# Patient Record
Sex: MALE | Race: WHITE | URBAN - METROPOLITAN AREA
[De-identification: names, ages, dates, MRNs, and addresses within clinical notes are randomized per-mention and may not be internally consistent; named-entity substitution may affect disease eponyms.]

---

## 2023-04-05 PROBLEM — G47.52 UNCONTROLLED REM SLEEP BEHAVIOR DISORDER: Status: ACTIVE | Noted: 2023-04-05

## 2023-04-05 PROBLEM — K21.9 GERD (GASTROESOPHAGEAL REFLUX DISEASE): Status: ACTIVE | Noted: 2023-04-05

## 2023-04-05 PROBLEM — F25.0: Status: ACTIVE | Noted: 2023-04-05

## 2023-04-05 PROBLEM — I87.2 VENOUS INSUFFICIENCY: Status: ACTIVE | Noted: 2023-04-05

## 2023-04-05 PROBLEM — G30.9 ALZHEIMER'S DEMENTIA WITH BEHAVIORAL DISTURBANCE (MULTI): Status: ACTIVE | Noted: 2023-04-05

## 2023-04-05 PROBLEM — F32.A DEPRESSION: Status: ACTIVE | Noted: 2023-04-05

## 2023-04-05 PROBLEM — E55.9 VITAMIN D DEFICIENCY: Status: ACTIVE | Noted: 2023-04-05

## 2023-04-05 PROBLEM — J44.9 COPD (CHRONIC OBSTRUCTIVE PULMONARY DISEASE) (MULTI): Status: ACTIVE | Noted: 2023-04-05

## 2023-04-05 PROBLEM — R53.81 DEBILITY: Status: ACTIVE | Noted: 2023-04-05

## 2023-04-05 PROBLEM — R29.6 MULTIPLE FALLS: Status: ACTIVE | Noted: 2023-04-05

## 2023-04-05 PROBLEM — N40.0 BPH (BENIGN PROSTATIC HYPERPLASIA): Status: ACTIVE | Noted: 2023-04-05

## 2023-04-05 PROBLEM — G20.A1 PARKINSONS DISEASE (MULTI): Status: ACTIVE | Noted: 2023-04-05

## 2023-04-05 PROBLEM — I45.10 RIGHT BUNDLE BRANCH BLOCK (RBBB) ON ELECTROCARDIOGRAPHY: Status: ACTIVE | Noted: 2023-04-05

## 2023-04-05 PROBLEM — F02.818 ALZHEIMER'S DEMENTIA WITH BEHAVIORAL DISTURBANCE (MULTI): Status: ACTIVE | Noted: 2023-04-05

## 2023-04-05 PROBLEM — M15.9 OSTEOARTHRITIS, GENERALIZED: Status: ACTIVE | Noted: 2023-04-05

## 2023-04-05 PROBLEM — R26.89 ABNORMALITY OF GAIT DUE TO IMPAIRMENT OF BALANCE: Status: ACTIVE | Noted: 2023-04-05

## 2023-04-05 PROBLEM — R29.818 NEUROCOGNITIVE DEFICITS: Status: ACTIVE | Noted: 2023-04-05

## 2023-04-05 PROBLEM — R46.81 OBSESSIVE-COMPULSIVE BEHAVIOR: Status: ACTIVE | Noted: 2023-04-05

## 2023-04-05 PROBLEM — E78.5 HYPERLIPIDEMIA: Status: ACTIVE | Noted: 2023-04-05

## 2023-04-05 PROBLEM — R41.89 NEUROCOGNITIVE DEFICITS: Status: ACTIVE | Noted: 2023-04-05

## 2023-04-05 PROBLEM — F03.918: Status: ACTIVE | Noted: 2023-04-05

## 2023-06-23 RX ORDER — MELOXICAM 7.5 MG/1
1 TABLET ORAL DAILY PRN
COMMUNITY
Start: 2021-05-17 | End: 2023-07-11 | Stop reason: SDUPTHER

## 2023-06-23 RX ORDER — DOCUSATE SODIUM 100 MG/1
100 CAPSULE, LIQUID FILLED ORAL 2 TIMES DAILY
COMMUNITY
Start: 2020-12-18

## 2023-06-23 RX ORDER — ZINC GLUCONATE 50 MG
TABLET ORAL
COMMUNITY

## 2023-06-23 RX ORDER — ASCORBIC ACID 500 MG
1 TABLET,CHEWABLE ORAL 2 TIMES DAILY
COMMUNITY

## 2023-06-23 RX ORDER — QUETIAPINE FUMARATE 25 MG/1
25 TABLET, FILM COATED ORAL
COMMUNITY
Start: 2019-07-18

## 2023-06-23 RX ORDER — CARBIDOPA AND LEVODOPA 25; 100 MG/1; MG/1
TABLET ORAL 3 TIMES DAILY
COMMUNITY
Start: 2021-06-15

## 2023-06-23 RX ORDER — DIVALPROEX SODIUM 500 MG/1
1 TABLET, DELAYED RELEASE ORAL NIGHTLY
COMMUNITY
Start: 2019-09-04

## 2023-06-23 RX ORDER — ERGOCALCIFEROL 1.25 MG/1
1 CAPSULE ORAL
COMMUNITY
Start: 2021-12-02 | End: 2023-07-11 | Stop reason: SDUPTHER

## 2023-06-23 RX ORDER — ACETAMINOPHEN 325 MG/1
325 TABLET ORAL
COMMUNITY

## 2023-06-23 RX ORDER — LANOLIN ALCOHOL/MO/W.PET/CERES
1 CREAM (GRAM) TOPICAL DAILY
COMMUNITY
Start: 2016-03-14

## 2023-06-23 RX ORDER — PRAVASTATIN SODIUM 40 MG/1
1 TABLET ORAL DAILY
COMMUNITY
End: 2023-07-11 | Stop reason: SDUPTHER

## 2023-06-23 RX ORDER — FINASTERIDE 5 MG/1
5 TABLET, FILM COATED ORAL DAILY
COMMUNITY
Start: 2018-07-05

## 2023-06-23 RX ORDER — MELATONIN 3 MG
CAPSULE ORAL
COMMUNITY
Start: 2020-12-18

## 2023-06-23 RX ORDER — ESCITALOPRAM OXALATE 10 MG/1
10 TABLET ORAL DAILY
COMMUNITY
Start: 2016-05-05

## 2023-06-23 RX ORDER — TAMSULOSIN HYDROCHLORIDE 0.4 MG/1
1 CAPSULE ORAL DAILY
COMMUNITY
Start: 2015-07-10

## 2023-06-23 RX ORDER — ALBUTEROL SULFATE 90 UG/1
2 AEROSOL, METERED RESPIRATORY (INHALATION)
COMMUNITY
Start: 2020-12-18

## 2023-06-23 RX ORDER — IPRATROPIUM BROMIDE AND ALBUTEROL SULFATE 2.5; .5 MG/3ML; MG/3ML
3 SOLUTION RESPIRATORY (INHALATION)
COMMUNITY
Start: 2021-04-26

## 2023-06-23 RX ORDER — PANTOPRAZOLE SODIUM 40 MG/1
1 TABLET, DELAYED RELEASE ORAL DAILY
COMMUNITY
Start: 2020-11-09 | End: 2023-07-11 | Stop reason: SDUPTHER

## 2023-07-11 ENCOUNTER — LAB (OUTPATIENT)
Dept: LAB | Facility: LAB | Age: 78
End: 2023-07-11
Payer: MEDICARE

## 2023-07-11 ENCOUNTER — OFFICE VISIT (OUTPATIENT)
Dept: PRIMARY CARE | Facility: CLINIC | Age: 78
End: 2023-07-11
Payer: MEDICARE

## 2023-07-11 VITALS
OXYGEN SATURATION: 90 % | BODY MASS INDEX: 35.68 KG/M2 | HEIGHT: 66 IN | WEIGHT: 222 LBS | HEART RATE: 92 BPM | DIASTOLIC BLOOD PRESSURE: 72 MMHG | SYSTOLIC BLOOD PRESSURE: 118 MMHG

## 2023-07-11 DIAGNOSIS — R53.81 DEBILITY: ICD-10-CM

## 2023-07-11 DIAGNOSIS — E55.9 VITAMIN D DEFICIENCY: ICD-10-CM

## 2023-07-11 DIAGNOSIS — R39.198 SLOWING OF URINARY STREAM: ICD-10-CM

## 2023-07-11 DIAGNOSIS — R46.81 OBSESSIVE-COMPULSIVE BEHAVIOR: ICD-10-CM

## 2023-07-11 DIAGNOSIS — N40.0 BENIGN PROSTATIC HYPERPLASIA, UNSPECIFIED WHETHER LOWER URINARY TRACT SYMPTOMS PRESENT: ICD-10-CM

## 2023-07-11 DIAGNOSIS — R53.83 FATIGUE, UNSPECIFIED TYPE: ICD-10-CM

## 2023-07-11 DIAGNOSIS — F02.818 ALZHEIMER'S DEMENTIA WITH BEHAVIORAL DISTURBANCE (MULTI): ICD-10-CM

## 2023-07-11 DIAGNOSIS — E29.1 HYPOGONADISM MALE: ICD-10-CM

## 2023-07-11 DIAGNOSIS — R35.0 BENIGN PROSTATIC HYPERPLASIA WITH URINARY FREQUENCY: ICD-10-CM

## 2023-07-11 DIAGNOSIS — F32.A DEPRESSION, UNSPECIFIED DEPRESSION TYPE: ICD-10-CM

## 2023-07-11 DIAGNOSIS — E11.9 TYPE 2 DIABETES MELLITUS WITHOUT COMPLICATION, UNSPECIFIED WHETHER LONG TERM INSULIN USE (MULTI): ICD-10-CM

## 2023-07-11 DIAGNOSIS — R26.89 ABNORMALITY OF GAIT DUE TO IMPAIRMENT OF BALANCE: ICD-10-CM

## 2023-07-11 DIAGNOSIS — G30.9 ALZHEIMER'S DEMENTIA WITH BEHAVIORAL DISTURBANCE (MULTI): ICD-10-CM

## 2023-07-11 DIAGNOSIS — R41.89 NEUROCOGNITIVE DEFICITS: ICD-10-CM

## 2023-07-11 DIAGNOSIS — E78.5 HYPERLIPIDEMIA, UNSPECIFIED HYPERLIPIDEMIA TYPE: ICD-10-CM

## 2023-07-11 DIAGNOSIS — K59.04 CHRONIC IDIOPATHIC CONSTIPATION: ICD-10-CM

## 2023-07-11 DIAGNOSIS — Z51.81 ENCOUNTER FOR THERAPEUTIC DRUG LEVEL MONITORING: ICD-10-CM

## 2023-07-11 DIAGNOSIS — Z13.220 SCREENING, LIPID: ICD-10-CM

## 2023-07-11 DIAGNOSIS — N40.1 BENIGN PROSTATIC HYPERPLASIA WITH URINARY FREQUENCY: ICD-10-CM

## 2023-07-11 DIAGNOSIS — G20.A1 PARKINSONS DISEASE (MULTI): ICD-10-CM

## 2023-07-11 DIAGNOSIS — K21.9 GASTROESOPHAGEAL REFLUX DISEASE WITHOUT ESOPHAGITIS: ICD-10-CM

## 2023-07-11 DIAGNOSIS — R29.818 NEUROCOGNITIVE DEFICITS: ICD-10-CM

## 2023-07-11 DIAGNOSIS — M15.9 OSTEOARTHRITIS, GENERALIZED: Primary | ICD-10-CM

## 2023-07-11 LAB
ALANINE AMINOTRANSFERASE (SGPT) (U/L) IN SER/PLAS: 9 U/L (ref 10–52)
ALBUMIN (G/DL) IN SER/PLAS: 4 G/DL (ref 3.4–5)
ALKALINE PHOSPHATASE (U/L) IN SER/PLAS: 50 U/L (ref 33–136)
ANION GAP IN SER/PLAS: 13 MMOL/L (ref 10–20)
ASPARTATE AMINOTRANSFERASE (SGOT) (U/L) IN SER/PLAS: 14 U/L (ref 9–39)
BILIRUBIN TOTAL (MG/DL) IN SER/PLAS: 1.1 MG/DL (ref 0–1.2)
CALCIDIOL (25 OH VITAMIN D3) (NG/ML) IN SER/PLAS: 60 NG/ML
CALCIUM (MG/DL) IN SER/PLAS: 9.7 MG/DL (ref 8.6–10.3)
CARBON DIOXIDE, TOTAL (MMOL/L) IN SER/PLAS: 28 MMOL/L (ref 21–32)
CHLORIDE (MMOL/L) IN SER/PLAS: 101 MMOL/L (ref 98–107)
CHOLESTEROL (MG/DL) IN SER/PLAS: 138 MG/DL (ref 0–199)
CHOLESTEROL IN HDL (MG/DL) IN SER/PLAS: 46.3 MG/DL
CHOLESTEROL/HDL RATIO: 3
CREATININE (MG/DL) IN SER/PLAS: 1.34 MG/DL (ref 0.5–1.3)
ERYTHROCYTE DISTRIBUTION WIDTH (RATIO) BY AUTOMATED COUNT: 13.5 % (ref 11.5–14.5)
ERYTHROCYTE MEAN CORPUSCULAR HEMOGLOBIN CONCENTRATION (G/DL) BY AUTOMATED: 33.2 G/DL (ref 32–36)
ERYTHROCYTE MEAN CORPUSCULAR VOLUME (FL) BY AUTOMATED COUNT: 95 FL (ref 80–100)
ERYTHROCYTES (10*6/UL) IN BLOOD BY AUTOMATED COUNT: 4.64 X10E12/L (ref 4.5–5.9)
GFR MALE: 54 ML/MIN/1.73M2
GLUCOSE (MG/DL) IN SER/PLAS: 83 MG/DL (ref 74–99)
HEMATOCRIT (%) IN BLOOD BY AUTOMATED COUNT: 44 % (ref 41–52)
HEMOGLOBIN (G/DL) IN BLOOD: 14.6 G/DL (ref 13.5–17.5)
LDL: 71 MG/DL (ref 0–99)
LEUKOCYTES (10*3/UL) IN BLOOD BY AUTOMATED COUNT: 6.5 X10E9/L (ref 4.4–11.3)
PLATELETS (10*3/UL) IN BLOOD AUTOMATED COUNT: 193 X10E9/L (ref 150–450)
POTASSIUM (MMOL/L) IN SER/PLAS: 4.3 MMOL/L (ref 3.5–5.3)
PROSTATE SPECIFIC AG (NG/ML) IN SER/PLAS: 2.16 NG/ML (ref 0–4)
PROTEIN TOTAL: 7.2 G/DL (ref 6.4–8.2)
SODIUM (MMOL/L) IN SER/PLAS: 138 MMOL/L (ref 136–145)
THYROTROPIN (MIU/L) IN SER/PLAS BY DETECTION LIMIT <= 0.05 MIU/L: 1.28 MIU/L (ref 0.44–3.98)
TRIGLYCERIDE (MG/DL) IN SER/PLAS: 106 MG/DL (ref 0–149)
UREA NITROGEN (MG/DL) IN SER/PLAS: 24 MG/DL (ref 6–23)
VALPROIC ACID (UG/ML) IN SER/PLAS: 46 UG/ML (ref 50–100)
VLDL: 21 MG/DL (ref 0–40)

## 2023-07-11 PROCEDURE — 1157F ADVNC CARE PLAN IN RCRD: CPT | Performed by: FAMILY MEDICINE

## 2023-07-11 PROCEDURE — 80061 LIPID PANEL: CPT

## 2023-07-11 PROCEDURE — 1126F AMNT PAIN NOTED NONE PRSNT: CPT | Performed by: FAMILY MEDICINE

## 2023-07-11 PROCEDURE — 1036F TOBACCO NON-USER: CPT | Performed by: FAMILY MEDICINE

## 2023-07-11 PROCEDURE — 80164 ASSAY DIPROPYLACETIC ACD TOT: CPT

## 2023-07-11 PROCEDURE — 36415 COLL VENOUS BLD VENIPUNCTURE: CPT

## 2023-07-11 PROCEDURE — 84443 ASSAY THYROID STIM HORMONE: CPT

## 2023-07-11 PROCEDURE — 80299 QUANTITATIVE ASSAY DRUG: CPT

## 2023-07-11 PROCEDURE — 80053 COMPREHEN METABOLIC PANEL: CPT

## 2023-07-11 PROCEDURE — 84153 ASSAY OF PSA TOTAL: CPT

## 2023-07-11 PROCEDURE — 1159F MED LIST DOCD IN RCRD: CPT | Performed by: FAMILY MEDICINE

## 2023-07-11 PROCEDURE — 1160F RVW MEDS BY RX/DR IN RCRD: CPT | Performed by: FAMILY MEDICINE

## 2023-07-11 PROCEDURE — 83036 HEMOGLOBIN GLYCOSYLATED A1C: CPT

## 2023-07-11 PROCEDURE — 84403 ASSAY OF TOTAL TESTOSTERONE: CPT

## 2023-07-11 PROCEDURE — 85027 COMPLETE CBC AUTOMATED: CPT

## 2023-07-11 PROCEDURE — 82306 VITAMIN D 25 HYDROXY: CPT

## 2023-07-11 PROCEDURE — 99215 OFFICE O/P EST HI 40 MIN: CPT | Performed by: FAMILY MEDICINE

## 2023-07-11 RX ORDER — POLYETHYLENE GLYCOL 3350 17 G/17G
17 POWDER, FOR SOLUTION ORAL DAILY
COMMUNITY
End: 2023-07-11 | Stop reason: SDUPTHER

## 2023-07-11 RX ORDER — POLYETHYLENE GLYCOL 3350 17 G/17G
17 POWDER, FOR SOLUTION ORAL DAILY
Qty: 1700 G | Refills: 3 | Status: SHIPPED | OUTPATIENT
Start: 2023-07-11

## 2023-07-11 RX ORDER — ASPIRIN 81 MG/1
81 TABLET ORAL DAILY
COMMUNITY

## 2023-07-11 RX ORDER — PRAVASTATIN SODIUM 40 MG/1
40 TABLET ORAL DAILY
Qty: 90 TABLET | Refills: 3 | Status: SHIPPED | OUTPATIENT
Start: 2023-07-11

## 2023-07-11 RX ORDER — PANTOPRAZOLE SODIUM 40 MG/1
40 TABLET, DELAYED RELEASE ORAL DAILY
Qty: 90 TABLET | Refills: 3 | Status: SHIPPED | OUTPATIENT
Start: 2023-07-11

## 2023-07-11 RX ORDER — GUAIFENESIN/DEXTROMETHORPHAN 100-10MG/5
5 SYRUP ORAL AS NEEDED
COMMUNITY

## 2023-07-11 RX ORDER — ERGOCALCIFEROL 1.25 MG/1
1 CAPSULE ORAL
Qty: 12 CAPSULE | Refills: 3 | Status: SHIPPED | OUTPATIENT
Start: 2023-07-11

## 2023-07-11 RX ORDER — MELOXICAM 7.5 MG/1
7.5 TABLET ORAL DAILY
Qty: 90 TABLET | Refills: 3 | Status: SHIPPED | OUTPATIENT
Start: 2023-07-11

## 2023-07-11 ASSESSMENT — PATIENT HEALTH QUESTIONNAIRE - PHQ9
2. FEELING DOWN, DEPRESSED OR HOPELESS: NOT AT ALL
1. LITTLE INTEREST OR PLEASURE IN DOING THINGS: NOT AT ALL
SUM OF ALL RESPONSES TO PHQ9 QUESTIONS 1 & 2: 0

## 2023-07-11 NOTE — PATIENT INSTRUCTIONS
Change Miralax to 17 gm DAILY  Continue Colace 100mg twice daily  Increase fluid intake to 100 oz per day.    Add Parkinson's Disease to active diagnosis list - refer to Dr. Daniel's office notes

## 2023-07-11 NOTE — PROGRESS NOTES
General Medical Management Note    77 y.o. male presents for Medical Management.  His brother is present.  HPI    No recent hospitalizations, surgeries or significant injuries.  Patient is walking with a walker due to advancing Parkinson's disease.  He continues to be able to feed himself and make his needs known verbally.  He is residing at [facility] in [place].  His brother is POA and assists with all physician office visits.    Parkinson's disease is managed by neurologist, Dr. Alondra Daniel.  Depression and dementia are managed by a psychiatrist, Dr. Melchor Drake.  BPH is managed by urologist, Dr. Garcia    No BM x 1 wk despite colace BID and Miralax every other day.  He is not sure how much fluid he drinks each day.  His ambulation is limited due to Parkinson's.    Patient had a bronchial and lung infection during the past winter.  He no longer uses an inhaler or DuoNeb nebulized treatments.    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    Drug-induced parkinsonism    Personal history of other diseases of male genital organs     History of testicular mass    Personal history of other diseases of the digestive system     History of hiatal hernia    Personal history of other medical treatment     History of nuclear stress test    Personal history of other mental and behavioral disorders 07/16/2019    History of paranoid schizophrenia    Unilateral inguinal hernia, without obstruction or gangrene, not specified as recurrent     Inguinal  hernia      Past Surgical History:   Procedure Laterality Date    HERNIA REPAIR  2014    Inguinal Hernia Repair    OTHER SURGICAL HISTORY  2014    Cardiovascular Stress Test    OTHER SURGICAL HISTORY  2019    Mohs surgery     Family History   Problem Relation Name Age of Onset    Heart failure Mother      Heart failure Father      Bipolar disorder Mother's Sister        Social History     Socioeconomic History    Marital status:      Spouse name: Not on file    Number of children: Not on file    Years of education: Not on file    Highest education level: Not on file   Occupational History    Not on file   Tobacco Use    Smoking status: Former     Types: Cigarettes     Quit date: 1960     Years since quittin.5    Smokeless tobacco: Never   Substance and Sexual Activity    Alcohol use: Yes     Comment: rarely    Drug use: Never    Sexual activity: Not on file   Other Topics Concern    Not on file   Social History Narrative    Not on file     Social Determinants of Health     Financial Resource Strain: Not on file   Food Insecurity: Not on file   Transportation Needs: Not on file   Physical Activity: Not on file   Stress: Not on file   Social Connections: Not on file   Intimate Partner Violence: Not on file   Housing Stability: Not on file       Current Outpatient Medications on File Prior to Visit   Medication Sig Dispense Refill    acetaminophen (Tylenol) 325 mg tablet Take 1 tablet (325 mg) by mouth. Give 2 tablets by mouth every 6 hrs as needed for pain; elevated temp.      albuterol 90 mcg/actuation inhaler Inhale 2 puffs 4 times a day.      ascorbic acid (Vitamin C) 500 mg chewable tablet Chew 1 tablet (500 mg) 2 times a day.      aspirin 81 mg EC tablet Take 1 tablet (81 mg) by mouth once daily. Give 1 tablet orally one time a day      carbidopa-levodopa (Sinemet)  mg tablet Take by mouth 3 times a day. 1.5 tablet by mouth three times a day      cyanocobalamin (Vitamin B-12)  1,000 mcg tablet Take 1 tablet (1,000 mcg) by mouth once daily.      dextromethorphan-guaifenesin (Robitussin DM)  mg/5 mL syrup Take 5 mL by mouth if needed for cough. Give 10 mL by mouth every 4 hours prn cough      divalproex (Depakote) 500 mg EC tablet Take 1 tablet (500 mg) by mouth once daily at bedtime.      docusate sodium (Colace) 100 mg capsule Take 1 capsule (100 mg) by mouth twice a day. 1 capsule by mouth every 8 hrs prn constipation      ergocalciferol (Vitamin D-2) 1.25 MG (32595 UT) capsule Take 1 capsule (1,250 mcg) by mouth 1 (one) time per week.      escitalopram (Lexapro) 10 mg tablet Take 1 tablet (10 mg) by mouth once daily.      finasteride (Proscar) 5 mg tablet Take 1 tablet (5 mg) by mouth once daily.      folic acid/multivit-min/lutein (CENTRUM SILVER ORAL) Take by mouth.      ipratropium-albuteroL (Duo-Neb) 0.5-2.5 mg/3 mL nebulizer solution Take 3 mL by nebulization 3 times a day. 1 vial inhaled orally via nebulizer every 6 hrs prn shortness of breath, wheezing      melatonin 3 mg capsule Take by mouth.      meloxicam (Mobic) 7.5 mg tablet Take 1 tablet (7.5 mg) by mouth once daily as needed.      pantoprazole (ProtoNix) 40 mg EC tablet Take 1 tablet (40 mg) by mouth once daily.      polyethylene glycol (Glycolax, Miralax) 17 gram/dose powder Take 17 g by mouth once daily. Give 17 gram by mouth one time a day every other day for constipation      pravastatin (Pravachol) 40 mg tablet Take 1 tablet (40 mg) by mouth once daily.      QUEtiapine (SEROquel) 25 mg tablet Take 1 tablet (25 mg) by mouth. Give 0.5 tablet orally every 6 hrs prn agitation      tamsulosin (Flomax) 0.4 mg 24 hr capsule Take 1 capsule (0.4 mg) by mouth once daily.      zinc gluconate 50 mg tablet Take by mouth.       No current facility-administered medications on file prior to visit.       No Known Allergies      ROS: Denies chest pain, SOB, Headache, GI problems     Visit Vitals  /72   Pulse 92   Ht 1.676  "m (5' 6\")   Wt 101 kg (222 lb)   SpO2 90%   BMI 35.83 kg/m²   Smoking Status Former   BSA 2.17 m²        PHYSICAL EXAM:  Alert and oriented x3.  Eyes: EOM grossly intact  Neck supple without lymph adenopathy or carotid bruit.  No masses or thyromegaly  Heart regular rate and rhythm without murmur.  Lungs clear to auscultation.  Legs without edema.  Gait is non-antalgic  Speech clear.  Hearing adequate.          DIAGNOSIS/PLAN:  1. Encounter for therapeutic drug level monitoring  - Valproic Acid; Future  - Carbidopa and Levodopa Quant.Blood; Future    4. Abnormality of gait due to impairment of balance    5. Alzheimer's dementia with behavioral disturbance (CMS/HCC)  Follow-up with Dr. Drake    6. Benign prostatic hyperplasia with urinary frequency  Follow-up with Dr. Garcia    7. Parkinson's Disease  - Follow up with Dr. Daniel    8. Screening, lipid  - Lipid Panel; Future    9. 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 Slowing of urinary stream  - Prostate Specific Antigen; Future    11. Benign prostatic hyperplasia, unspecified whether lower urinary tract symptoms present  - Prostate Specific Antigen; Future    12. Vitamin D deficiency  - Vitamin D, Total; Future    Medications I am managing will be refilled for 1 year.        Return to office in 12 months for comprehensive medical evaluation, long-term medication use monitoring, and preventative services screening    Will continue to monitor, evaluate, assess and treat all problems/diagnoses as appropriate and continue to collaborate with specialists.    Encouraged to sign up with  [employer]    Contact office or send a  [employer] message with any questions or concerns    Patient will only be notified of labs that require medical intervention.    Prescriptions will not be filled unless you are compliant with your follow up appointments or have a follow up appointment scheduled as per instruction of your physician. Refills should be requested at the time " of your visit.    **Charting was completed using voice recognition technology and may include unintended errors**    Marcus Bender DO, FACOFP  Senior Attending Physician  [facility]  [address], [address]  [place] [address] [phone]       Marcus Bender DO, FACOFP

## 2023-07-12 LAB
ESTIMATED AVERAGE GLUCOSE FOR HBA1C: 108 MG/DL
HEMOGLOBIN A1C/HEMOGLOBIN TOTAL IN BLOOD: 5.4 %
TESTOSTERONE (NG/DL) IN SER/PLAS: 218 NG/DL (ref 240–1000)

## 2023-07-21 LAB
Lab: 0.5 MCG/ML
Lab: NORMAL

## 2023-10-11 ENCOUNTER — TELEPHONE (OUTPATIENT)
Dept: PRIMARY CARE | Facility: CLINIC | Age: 78
End: 2023-10-11
Payer: MEDICARE

## 2023-10-11 NOTE — TELEPHONE ENCOUNTER
Pippa with [facility] left .  Phone# [phone]  FAX# [phone]    Patient did not have a BM for one week. He finally had a large BM today. Nurse said he does not have any prn medication orders for constipation and would like to know if an order can be faxed to her.  FAX# [phone]

## 2023-11-02 PROBLEM — K59.09 CHRONIC CONSTIPATION: Status: ACTIVE | Noted: 2023-11-02

## 2023-12-04 ENCOUNTER — APPOINTMENT (OUTPATIENT)
Dept: PRIMARY CARE | Facility: CLINIC | Age: 78
End: 2023-12-04
Payer: MEDICARE

## 2023-12-07 ENCOUNTER — APPOINTMENT (OUTPATIENT)
Dept: PRIMARY CARE | Facility: CLINIC | Age: 78
End: 2023-12-07
Payer: MEDICARE

## 2024-01-22 DIAGNOSIS — B37.2 INTERTRIGO OF GENITOCRURAL REGION DUE TO CANDIDA SPECIES: Primary | ICD-10-CM

## 2024-01-22 RX ORDER — NYSTATIN 100000 U/G
CREAM TOPICAL 2 TIMES DAILY
Qty: 30 G | Refills: 5 | Status: SHIPPED | OUTPATIENT
Start: 2024-01-22 | End: 2024-02-21

## 2024-01-24 ENCOUNTER — TELEPHONE (OUTPATIENT)
Dept: PRIMARY CARE | Facility: CLINIC | Age: 79
End: 2024-01-24
Payer: MEDICARE

## 2024-01-24 DIAGNOSIS — R10.2 PELVIC PAIN: ICD-10-CM

## 2024-01-24 DIAGNOSIS — R30.0 DYSURIA: ICD-10-CM

## 2024-01-24 DIAGNOSIS — R35.0 URINE FREQUENCY: ICD-10-CM

## 2024-01-24 NOTE — TELEPHONE ENCOUNTER
Nurse Pippa @ [facility] calling to request lab order for UA with Urine Culture. Patient is c/o dysuria, urine frequency and pelvic pain.    I put urine orders in and faxed to facility:    [facility]  FAX# [phone]  Phone# [phone]

## 2024-03-01 ENCOUNTER — TELEPHONE (OUTPATIENT)
Dept: PRIMARY CARE | Facility: CLINIC | Age: 79
End: 2024-03-01
Payer: MEDICARE

## 2024-03-01 DIAGNOSIS — R21 RASH: Primary | ICD-10-CM

## 2024-03-01 RX ORDER — NYSTATIN 100000 U/G
CREAM TOPICAL 2 TIMES DAILY
Qty: 30 G | Refills: 0 | Status: SHIPPED | OUTPATIENT
Start: 2024-03-01 | End: 2024-03-07

## 2024-03-01 NOTE — TELEPHONE ENCOUNTER
Constance Shields called from [facility] in [place] stating one of Dr Bender's patients has a fungal rash in his groin area and needs a script for nystatin cream or powder Is this something you are able to do?  If so she would like it faxed to her at   If not please forward to Dr Bender.    Zachery Fontaine is in room 301 at [facility]

## 2024-03-07 DIAGNOSIS — B37.2 INTERTRIGO OF GENITOCRURAL REGION DUE TO CANDIDA SPECIES: Primary | ICD-10-CM

## 2024-03-07 RX ORDER — NYSTATIN 100000 [USP'U]/G
POWDER TOPICAL
Qty: 60 G | Refills: 1 | Status: SHIPPED | OUTPATIENT
Start: 2024-03-07

## 2024-07-11 ENCOUNTER — APPOINTMENT (OUTPATIENT)
Dept: PRIMARY CARE | Facility: CLINIC | Age: 79
End: 2024-07-11
Payer: MEDICARE

## 2024-07-16 ENCOUNTER — TELEMEDICINE (OUTPATIENT)
Dept: BEHAVIORAL HEALTH | Facility: CLINIC | Age: 79
End: 2024-07-16
Payer: MEDICARE

## 2024-07-16 DIAGNOSIS — F25.0 SCHIZOAFFECTIVE DISORDER, MIXED TYPE (MULTI): ICD-10-CM

## 2024-07-16 DIAGNOSIS — F03.918 PSYCHOSIS IN ELDERLY WITH BEHAVIORAL DISTURBANCE (MULTI): ICD-10-CM

## 2024-07-16 DIAGNOSIS — R46.81 OBSESSIVE-COMPULSIVE BEHAVIOR: ICD-10-CM

## 2024-07-16 PROCEDURE — 99205 OFFICE O/P NEW HI 60 MIN: CPT | Performed by: PSYCHIATRY & NEUROLOGY

## 2024-07-16 PROCEDURE — 1157F ADVNC CARE PLAN IN RCRD: CPT | Performed by: PSYCHIATRY & NEUROLOGY

## 2024-07-16 PROCEDURE — 1159F MED LIST DOCD IN RCRD: CPT | Performed by: PSYCHIATRY & NEUROLOGY

## 2024-07-16 PROCEDURE — 1160F RVW MEDS BY RX/DR IN RCRD: CPT | Performed by: PSYCHIATRY & NEUROLOGY

## 2024-07-16 RX ORDER — DIVALPROEX SODIUM 500 MG/1
500 TABLET, DELAYED RELEASE ORAL NIGHTLY
Qty: 90 TABLET | Refills: 1 | Status: SHIPPED | OUTPATIENT
Start: 2024-07-16 | End: 2025-01-12

## 2024-07-16 RX ORDER — QUETIAPINE FUMARATE 25 MG/1
25 TABLET, FILM COATED ORAL NIGHTLY
Qty: 90 TABLET | Refills: 1 | Status: SHIPPED | OUTPATIENT
Start: 2024-07-16 | End: 2025-01-12

## 2024-07-16 RX ORDER — ESCITALOPRAM OXALATE 5 MG/1
TABLET ORAL
Qty: 10 TABLET | Refills: 0 | Status: SHIPPED | OUTPATIENT
Start: 2024-07-16

## 2024-07-16 RX ORDER — ESCITALOPRAM OXALATE 10 MG/1
10 TABLET ORAL DAILY
Qty: 90 TABLET | Refills: 1 | Status: SHIPPED | OUTPATIENT
Start: 2024-07-16 | End: 2024-07-16 | Stop reason: WASHOUT

## 2024-07-16 SDOH — HEALTH STABILITY: PHYSICAL HEALTH: ON AVERAGE, HOW MANY DAYS PER WEEK DO YOU ENGAGE IN MODERATE TO STRENUOUS EXERCISE (LIKE A BRISK WALK)?: 3 DAYS

## 2024-07-16 SDOH — ECONOMIC STABILITY: INCOME INSECURITY: IN THE LAST 12 MONTHS, WAS THERE A TIME WHEN YOU WERE NOT ABLE TO PAY THE MORTGAGE OR RENT ON TIME?: NO

## 2024-07-16 SDOH — ECONOMIC STABILITY: TRANSPORTATION INSECURITY
IN THE PAST 12 MONTHS, HAS THE LACK OF TRANSPORTATION KEPT YOU FROM MEDICAL APPOINTMENTS OR FROM GETTING MEDICATIONS?: NO

## 2024-07-16 SDOH — ECONOMIC STABILITY: FOOD INSECURITY: WITHIN THE PAST 12 MONTHS, THE FOOD YOU BOUGHT JUST DIDN'T LAST AND YOU DIDN'T HAVE MONEY TO GET MORE.: NEVER TRUE

## 2024-07-16 SDOH — HEALTH STABILITY: PHYSICAL HEALTH: ON AVERAGE, HOW MANY MINUTES DO YOU ENGAGE IN EXERCISE AT THIS LEVEL?: 20 MIN

## 2024-07-16 SDOH — ECONOMIC STABILITY: FOOD INSECURITY: WITHIN THE PAST 12 MONTHS, YOU WORRIED THAT YOUR FOOD WOULD RUN OUT BEFORE YOU GOT MONEY TO BUY MORE.: NEVER TRUE

## 2024-07-16 SDOH — ECONOMIC STABILITY: TRANSPORTATION INSECURITY
IN THE PAST 12 MONTHS, HAS LACK OF TRANSPORTATION KEPT YOU FROM MEETINGS, WORK, OR FROM GETTING THINGS NEEDED FOR DAILY LIVING?: NO

## 2024-07-16 ASSESSMENT — SOCIAL DETERMINANTS OF HEALTH (SDOH)
IN THE PAST 12 MONTHS, HAS THE ELECTRIC, GAS, OIL, OR WATER COMPANY THREATENED TO SHUT OFF SERVICE IN YOUR HOME?: NO
HOW HARD IS IT FOR YOU TO PAY FOR THE VERY BASICS LIKE FOOD, HOUSING, MEDICAL CARE, AND HEATING?: NOT HARD AT ALL
HOW OFTEN DO YOU GET TOGETHER WITH FRIENDS OR RELATIVES?: MORE THAN THREE TIMES A WEEK
HOW OFTEN DO YOU ATTEND CHURCH OR RELIGIOUS SERVICES?: MORE THAN 4 TIMES PER YEAR
DO YOU BELONG TO ANY CLUBS OR ORGANIZATIONS SUCH AS CHURCH GROUPS UNIONS, FRATERNAL OR ATHLETIC GROUPS, OR SCHOOL GROUPS?: YES
HOW OFTEN DO YOU GET TOGETHER WITH FRIENDS OR RELATIVES?: MORE THAN THREE TIMES A WEEK
WITHIN THE LAST YEAR, HAVE YOU BEEN KICKED, HIT, SLAPPED, OR OTHERWISE PHYSICALLY HURT BY YOUR PARTNER OR EX-PARTNER?: NO
WITHIN THE LAST YEAR, HAVE YOU BEEN AFRAID OF YOUR PARTNER OR EX-PARTNER?: NO
HOW OFTEN DO YOU ATTENT MEETINGS OF THE CLUB OR ORGANIZATION YOU BELONG TO?: MORE THAN 4 TIMES PER YEAR
WITHIN THE LAST YEAR, HAVE YOU BEEN HUMILIATED OR EMOTIONALLY ABUSED IN OTHER WAYS BY YOUR PARTNER OR EX-PARTNER?: NO
DO YOU BELONG TO ANY CLUBS OR ORGANIZATIONS SUCH AS CHURCH GROUPS UNIONS, FRATERNAL OR ATHLETIC GROUPS, OR SCHOOL GROUPS?: YES
HOW OFTEN DO YOU ATTENT MEETINGS OF THE CLUB OR ORGANIZATION YOU BELONG TO?: MORE THAN 4 TIMES PER YEAR
WITHIN THE LAST YEAR, HAVE TO BEEN RAPED OR FORCED TO HAVE ANY KIND OF SEXUAL ACTIVITY BY YOUR PARTNER OR EX-PARTNER?: NO
IN A TYPICAL WEEK, HOW MANY TIMES DO YOU TALK ON THE PHONE WITH FAMILY, FRIENDS, OR NEIGHBORS?: MORE THAN THREE TIMES A WEEK
IN A TYPICAL WEEK, HOW MANY TIMES DO YOU TALK ON THE PHONE WITH FAMILY, FRIENDS, OR NEIGHBORS?: MORE THAN THREE TIMES A WEEK
HOW OFTEN DO YOU ATTEND CHURCH OR RELIGIOUS SERVICES?: MORE THAN 4 TIMES PER YEAR

## 2024-07-16 ASSESSMENT — LIFESTYLE VARIABLES
HOW MANY STANDARD DRINKS CONTAINING ALCOHOL DO YOU HAVE ON A TYPICAL DAY: PATIENT DOES NOT DRINK
HOW OFTEN DO YOU HAVE SIX OR MORE DRINKS ON ONE OCCASION: NEVER

## 2024-07-16 NOTE — PROGRESS NOTES
Subjective   Patient ID: Zachery Fontaine is a 78 y.o. male who presents for No chief complaint on file..    The patient engaged in a telehealth session via Epic audio visual or phone with this provider practicing within the [facility]. The identity of the patient was verified by their date of birth and last four digits of their social security number. The provider demonstrated that confidentially was preserved at their location. The patient was informed that they were responsible for ensuring confidentially was secured at their location. The patient's location was documented for emergency purposes. The patient was informed of the necessary steps that would occur if an emergency was to occur or technology failed during session.     HPI: The patient is seen with his brother who is his guardian and his sister in law.    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)      Comment:  Drug-induced parkinsonism  No date: Personal history of other diseases of male genital organs      Comment:  History of testicular mass  No date: Personal history of other diseases of the digestive system      Comment:  History of hiatal hernia  No date: Personal history of other medical treatment      Comment:  History of nuclear stress test  07/16/2019: Personal history of other mental and behavioral disorders      Comment:  History of paranoid schizophrenia  No date: Unilateral inguinal hernia, without obstruction or gangrene,   not specified as recurrent       Comment:  Inguinal hernia      MSE: The patient is alert, fully oriented, language is intact, and recent and remote memory intact. The patient denies any suicidal or homicidal ideation or plans. The patient presents with no depressive, manic or psychotic symptoms. Thought is logical and clear. Judgment and insight are limited. No behavioral disturbances are present on examination.          Review of Systems   Neurological:         MSE: The patient is alert, fully oriented, language is intact, and recent and remote memory intact. The patient denies any suicidal or homicidal ideation or plans. The patient presents with no depressive, manic or psychotic symptoms. Thought is logical and clear. Judgment and insight are limited. No behavioral disturbances are present on examination.     Psychiatric/Behavioral:          MSE: The patient is alert, fully oriented, language is intact, and recent and remote memory intact. The patient denies any suicidal or homicidal ideation or plans. The patient presents with no depressive, manic or psychotic symptoms. Thought is logical and clear. Judgment and insight are limited. No behavioral disturbances are present on examination.       Psych Review of Symptoms:    ADHD: Patient denied any symptoms.         Anxiety: Patient denied any symptoms.         Developmental and Sensory Concerns: Patient denied any symptoms.         Depressive Symptoms: Patient denied any symptoms.         Disruptive and Conduct Symptoms: Patient denied any symptoms.         Eating / Feeding Concerns: Patient denied any symptoms.         Elimination Symptoms: Patient denied any symptoms.         Manic Symptoms: Patient denied any symptoms.         Obsessive-Compulsive Symptoms: Patient denied any symptoms.         Psychotic Symptoms: Patient denied any symptoms.           Trauma Related Symptoms: Patient denied any symptoms.           Sleep Concerns: Patient denied any symptoms.             Objective   Physical  Exam  Neurological:      General: No focal deficit present.      Mental Status: He is oriented to person, place, and time. Mental status is at baseline.      Comments: MSE: The patient is alert, fully oriented, language is intact, and recent and remote memory intact. The patient denies any suicidal or homicidal ideation or plans. The patient presents with no depressive, manic or psychotic symptoms. Thought is logical and clear. Judgment and insight are limited. No behavioral disturbances are present on examination.     Psychiatric:      Comments: MSE: The patient is alert, fully oriented, language is intact, and recent and remote memory intact. The patient denies any suicidal or homicidal ideation or plans. The patient presents with no depressive, manic or psychotic symptoms. Thought is logical and clear. Judgment and insight are limited. No behavioral disturbances are present on examination.           Lab Review:   No visits with results within 6 Month(s) from this visit.   Latest known visit with results is:   Lab on 07/11/2023   Component Date Value    Glucose 07/11/2023 83     Sodium 07/11/2023 138     Potassium 07/11/2023 4.3     Chloride 07/11/2023 101     Bicarbonate 07/11/2023 28     Anion Gap 07/11/2023 13     Urea Nitrogen 07/11/2023 24 (H)     Creatinine 07/11/2023 1.34 (H)     GFR MALE 07/11/2023 54 (A)     Calcium 07/11/2023 9.7     Albumin 07/11/2023 4.0     Alkaline Phosphatase 07/11/2023 50     Total Protein 07/11/2023 7.2     AST 07/11/2023 14     Total Bilirubin 07/11/2023 1.1     ALT (SGPT) 07/11/2023 9 (L)     Valproic Acid 07/11/2023 46 (L)     Carbidopa, Blood 07/11/2023 See Note     Levodopa, Blood 07/11/2023 0.50     Hemoglobin A1C 07/11/2023 5.4     Estimated Average Glucose 07/11/2023 108     Cholesterol 07/11/2023 138     HDL 07/11/2023 46.3     Cholesterol/HDL Ratio 07/11/2023 3.0     LDL 07/11/2023 71     VLDL 07/11/2023 21     Triglycerides 07/11/2023 106     Testosterone 07/11/2023 218  (L)     PSA 07/11/2023 2.16     TSH 07/11/2023 1.28     WBC 07/11/2023 6.5     RBC 07/11/2023 4.64     Hemoglobin 07/11/2023 14.6     Hematocrit 07/11/2023 44.0     MCV 07/11/2023 95     MCHC 07/11/2023 33.2     Platelets 07/11/2023 193     RDW 07/11/2023 13.5     Vitamin D, 25-Hydroxy 07/11/2023 60        Assessment/Plan   Psychiatric Risk Assessment  Violence Risk Assessment: none  Acute Risk of Harm to Others is Considered: low   Suicide Risk Assessment: age > 65 yrs old and   Protective Factors against Suicide: adherence to  treatment, fear of suicide, moral objections to suicide, positive family relationships, and sense of responsibility toward family  Acute Risk of Harm to Self is Considered: low    Diagnosis: schizoaffective disorder in substantial remission and obsessive compulsive disorder improved.    Treatment plan:  The FDA risks, benefits & alternatives to the medications prescribed were explained to you and your support person, your brother, Marcus Fontaine, who is your guardian with your sister in law, today. You & your support persons were able to understand & repeat these risks, benefits & alternatives to these prescribed medications. You and your support persons have agreed to proceed with treatment with the medications discussed based on the conclusion that the benefit outweighs the risks of this treatment regimen: continue quetiapine 25 mg at bedtime, continue Depakote  mg for now with plan to attempt to decrease and possibly eliminate as discussed, taper escitalopram from 10 mg daily to 5 mg daily for 7 days then stop.    Obtain valproate level, CBC with differential and CMP as ordered.     Follow up in 3 to 4 weeks.

## 2024-07-18 ENCOUNTER — TELEPHONE (OUTPATIENT)
Dept: BEHAVIORAL HEALTH | Facility: CLINIC | Age: 79
End: 2024-07-18
Payer: MEDICARE

## 2024-07-18 NOTE — PROGRESS NOTES
Kimberlee from [facility] called stating that pt's POA told that Dr. Drake had changes to pt's orders, but they did not receive any new orders. She requested for the orders to be faxed to them. This nurse faxed the latest MD's office note to the facility as requested.

## 2024-07-24 ENCOUNTER — APPOINTMENT (OUTPATIENT)
Dept: NEUROLOGY | Facility: CLINIC | Age: 79
End: 2024-07-24
Payer: MEDICARE

## 2024-07-24 ENCOUNTER — APPOINTMENT (OUTPATIENT)
Dept: PRIMARY CARE | Facility: CLINIC | Age: 79
End: 2024-07-24
Payer: MEDICARE

## 2024-07-24 VITALS
BODY MASS INDEX: 29.92 KG/M2 | OXYGEN SATURATION: 94 % | WEIGHT: 209 LBS | SYSTOLIC BLOOD PRESSURE: 124 MMHG | HEIGHT: 70 IN | HEART RATE: 102 BPM | DIASTOLIC BLOOD PRESSURE: 69 MMHG

## 2024-07-24 VITALS
BODY MASS INDEX: 30.15 KG/M2 | SYSTOLIC BLOOD PRESSURE: 114 MMHG | WEIGHT: 210.6 LBS | RESPIRATION RATE: 18 BRPM | DIASTOLIC BLOOD PRESSURE: 76 MMHG | HEIGHT: 70 IN | HEART RATE: 100 BPM | TEMPERATURE: 97.3 F

## 2024-07-24 DIAGNOSIS — G20.A1 PARKINSON'S DISEASE, UNSPECIFIED WHETHER DYSKINESIA PRESENT, UNSPECIFIED WHETHER MANIFESTATIONS FLUCTUATE (MULTI): ICD-10-CM

## 2024-07-24 DIAGNOSIS — F03.918 PSYCHOSIS IN ELDERLY WITH BEHAVIORAL DISTURBANCE (MULTI): ICD-10-CM

## 2024-07-24 DIAGNOSIS — R35.0 BENIGN PROSTATIC HYPERPLASIA WITH URINARY FREQUENCY: ICD-10-CM

## 2024-07-24 DIAGNOSIS — Z00.00 HEALTH MAINTENANCE EXAMINATION: Primary | ICD-10-CM

## 2024-07-24 DIAGNOSIS — N40.1 BENIGN PROSTATIC HYPERPLASIA WITH URINARY FREQUENCY: ICD-10-CM

## 2024-07-24 DIAGNOSIS — R29.6 MULTIPLE FALLS: ICD-10-CM

## 2024-07-24 DIAGNOSIS — K59.04 CHRONIC IDIOPATHIC CONSTIPATION: ICD-10-CM

## 2024-07-24 DIAGNOSIS — B37.2 INTERTRIGO OF GENITOCRURAL REGION DUE TO CANDIDA SPECIES: ICD-10-CM

## 2024-07-24 DIAGNOSIS — R41.89 NEUROCOGNITIVE DEFICITS: ICD-10-CM

## 2024-07-24 DIAGNOSIS — Z00.00 MEDICARE ANNUAL WELLNESS VISIT, SUBSEQUENT: ICD-10-CM

## 2024-07-24 DIAGNOSIS — R39.198 SLOWING OF URINARY STREAM: ICD-10-CM

## 2024-07-24 DIAGNOSIS — R46.81 OBSESSIVE-COMPULSIVE BEHAVIOR: ICD-10-CM

## 2024-07-24 DIAGNOSIS — E29.1 HYPOGONADISM MALE: ICD-10-CM

## 2024-07-24 DIAGNOSIS — E78.5 HYPERLIPIDEMIA, UNSPECIFIED HYPERLIPIDEMIA TYPE: ICD-10-CM

## 2024-07-24 DIAGNOSIS — Z51.81 ENCOUNTER FOR THERAPEUTIC DRUG LEVEL MONITORING: ICD-10-CM

## 2024-07-24 DIAGNOSIS — G20.A1 PARKINSON'S DISEASE WITHOUT FLUCTUATING MANIFESTATIONS, UNSPECIFIED WHETHER DYSKINESIA PRESENT (MULTI): Primary | ICD-10-CM

## 2024-07-24 DIAGNOSIS — R29.818 NEUROCOGNITIVE DEFICITS: ICD-10-CM

## 2024-07-24 DIAGNOSIS — M15.9 OSTEOARTHRITIS, GENERALIZED: ICD-10-CM

## 2024-07-24 DIAGNOSIS — K21.9 GASTROESOPHAGEAL REFLUX DISEASE WITHOUT ESOPHAGITIS: ICD-10-CM

## 2024-07-24 DIAGNOSIS — E55.9 VITAMIN D DEFICIENCY: ICD-10-CM

## 2024-07-24 DIAGNOSIS — G20.B1 PARKINSON'S DISEASE WITH DYSKINESIA WITHOUT FLUCTUATING MANIFESTATIONS (MULTI): ICD-10-CM

## 2024-07-24 DIAGNOSIS — J44.9 CHRONIC OBSTRUCTIVE PULMONARY DISEASE, UNSPECIFIED COPD TYPE (MULTI): ICD-10-CM

## 2024-07-24 DIAGNOSIS — F25.0 SCHIZOAFFECTIVE DISORDER, MIXED TYPE (MULTI): ICD-10-CM

## 2024-07-24 PROBLEM — E11.9 TYPE 2 DIABETES MELLITUS WITHOUT COMPLICATION, UNSPECIFIED WHETHER LONG TERM INSULIN USE (MULTI): Status: ACTIVE | Noted: 2024-07-24

## 2024-07-24 PROCEDURE — 1157F ADVNC CARE PLAN IN RCRD: CPT | Performed by: PSYCHIATRY & NEUROLOGY

## 2024-07-24 PROCEDURE — 1159F MED LIST DOCD IN RCRD: CPT | Performed by: FAMILY MEDICINE

## 2024-07-24 PROCEDURE — 1123F ACP DISCUSS/DSCN MKR DOCD: CPT | Performed by: FAMILY MEDICINE

## 2024-07-24 PROCEDURE — 1126F AMNT PAIN NOTED NONE PRSNT: CPT | Performed by: PSYCHIATRY & NEUROLOGY

## 2024-07-24 PROCEDURE — 85027 COMPLETE CBC AUTOMATED: CPT

## 2024-07-24 PROCEDURE — 1157F ADVNC CARE PLAN IN RCRD: CPT | Performed by: FAMILY MEDICINE

## 2024-07-24 PROCEDURE — 1036F TOBACCO NON-USER: CPT | Performed by: PSYCHIATRY & NEUROLOGY

## 2024-07-24 PROCEDURE — 1170F FXNL STATUS ASSESSED: CPT | Performed by: FAMILY MEDICINE

## 2024-07-24 PROCEDURE — 80053 COMPREHEN METABOLIC PANEL: CPT

## 2024-07-24 PROCEDURE — 84403 ASSAY OF TOTAL TESTOSTERONE: CPT

## 2024-07-24 PROCEDURE — 36415 COLL VENOUS BLD VENIPUNCTURE: CPT

## 2024-07-24 PROCEDURE — 1160F RVW MEDS BY RX/DR IN RCRD: CPT | Performed by: PSYCHIATRY & NEUROLOGY

## 2024-07-24 PROCEDURE — 99397 PER PM REEVAL EST PAT 65+ YR: CPT | Performed by: FAMILY MEDICINE

## 2024-07-24 PROCEDURE — G0446 INTENS BEHAVE THER CARDIO DX: HCPCS | Performed by: FAMILY MEDICINE

## 2024-07-24 PROCEDURE — 99497 ADVNCD CARE PLAN 30 MIN: CPT | Performed by: FAMILY MEDICINE

## 2024-07-24 PROCEDURE — 99214 OFFICE O/P EST MOD 30 MIN: CPT | Performed by: FAMILY MEDICINE

## 2024-07-24 PROCEDURE — 82306 VITAMIN D 25 HYDROXY: CPT

## 2024-07-24 PROCEDURE — 1159F MED LIST DOCD IN RCRD: CPT | Performed by: PSYCHIATRY & NEUROLOGY

## 2024-07-24 PROCEDURE — 1160F RVW MEDS BY RX/DR IN RCRD: CPT | Performed by: FAMILY MEDICINE

## 2024-07-24 PROCEDURE — G2211 COMPLEX E/M VISIT ADD ON: HCPCS | Performed by: PSYCHIATRY & NEUROLOGY

## 2024-07-24 PROCEDURE — 99214 OFFICE O/P EST MOD 30 MIN: CPT | Performed by: PSYCHIATRY & NEUROLOGY

## 2024-07-24 PROCEDURE — 80061 LIPID PANEL: CPT

## 2024-07-24 PROCEDURE — 1036F TOBACCO NON-USER: CPT | Performed by: FAMILY MEDICINE

## 2024-07-24 PROCEDURE — G0439 PPPS, SUBSEQ VISIT: HCPCS | Performed by: FAMILY MEDICINE

## 2024-07-24 RX ORDER — AMOXICILLIN 250 MG
1 CAPSULE ORAL 2 TIMES DAILY
Qty: 180 TABLET | Refills: 3 | Status: SHIPPED | OUTPATIENT
Start: 2024-07-24 | End: 2025-07-24

## 2024-07-24 RX ORDER — MELOXICAM 7.5 MG/1
7.5 TABLET ORAL DAILY
Qty: 90 TABLET | Refills: 3 | Status: SHIPPED | OUTPATIENT
Start: 2024-07-24

## 2024-07-24 RX ORDER — TAMSULOSIN HYDROCHLORIDE 0.4 MG/1
0.4 CAPSULE ORAL 2 TIMES DAILY
Qty: 180 CAPSULE | Refills: 3 | Status: SHIPPED | OUTPATIENT
Start: 2024-07-24

## 2024-07-24 RX ORDER — POLYETHYLENE GLYCOL 3350 17 G/17G
17 POWDER, FOR SOLUTION ORAL DAILY
Qty: 1700 G | Refills: 3 | Status: SHIPPED | OUTPATIENT
Start: 2024-07-24

## 2024-07-24 RX ORDER — MULTIVIT-MIN/FA/LYCOPEN/LUTEIN .4-300-25
1 TABLET ORAL DAILY
Qty: 90 TABLET | Refills: 3 | Status: SHIPPED | OUTPATIENT
Start: 2024-07-24

## 2024-07-24 RX ORDER — PANTOPRAZOLE SODIUM 40 MG/1
40 TABLET, DELAYED RELEASE ORAL DAILY
Qty: 90 TABLET | Refills: 3 | Status: SHIPPED | OUTPATIENT
Start: 2024-07-24

## 2024-07-24 RX ORDER — MELATONIN 3 MG
3 CAPSULE ORAL DAILY
Qty: 90 CAPSULE | Refills: 3 | Status: SHIPPED | OUTPATIENT
Start: 2024-07-24

## 2024-07-24 RX ORDER — FINASTERIDE 5 MG/1
5 TABLET, FILM COATED ORAL DAILY
Qty: 90 TABLET | Refills: 3 | Status: SHIPPED | OUTPATIENT
Start: 2024-07-24

## 2024-07-24 RX ORDER — PRAVASTATIN SODIUM 40 MG/1
40 TABLET ORAL DAILY
Qty: 90 TABLET | Refills: 3 | Status: SHIPPED | OUTPATIENT
Start: 2024-07-24

## 2024-07-24 RX ORDER — ERGOCALCIFEROL 1.25 MG/1
1 CAPSULE ORAL
Qty: 12 CAPSULE | Refills: 3 | Status: SHIPPED | OUTPATIENT
Start: 2024-07-28

## 2024-07-24 RX ORDER — ASCORBIC ACID 500 MG
500 TABLET,CHEWABLE ORAL 2 TIMES DAILY
Qty: 180 TABLET | Refills: 3 | Status: SHIPPED | OUTPATIENT
Start: 2024-07-24

## 2024-07-24 RX ORDER — NYSTATIN 100000 [USP'U]/G
POWDER TOPICAL
Qty: 60 G | Refills: 11 | Status: SHIPPED | OUTPATIENT
Start: 2024-07-24

## 2024-07-24 RX ORDER — LANOLIN ALCOHOL/MO/W.PET/CERES
1000 CREAM (GRAM) TOPICAL DAILY
Qty: 90 TABLET | Refills: 3 | Status: SHIPPED | OUTPATIENT
Start: 2024-07-24

## 2024-07-24 RX ORDER — ASPIRIN 81 MG/1
81 TABLET ORAL DAILY
Qty: 90 TABLET | Refills: 3 | Status: SHIPPED | OUTPATIENT
Start: 2024-07-24

## 2024-07-24 ASSESSMENT — ACTIVITIES OF DAILY LIVING (ADL)
DOING_HOUSEWORK: TOTAL CARE
GROCERY_SHOPPING: TOTAL CARE
MANAGING_FINANCES: TOTAL CARE
BATHING: NEEDS ASSISTANCE
DRESSING: NEEDS ASSISTANCE
TAKING_MEDICATION: TOTAL CARE

## 2024-07-24 ASSESSMENT — ENCOUNTER SYMPTOMS
LOSS OF SENSATION IN FEET: 1
OCCASIONAL FEELINGS OF UNSTEADINESS: 1
DEPRESSION: 0

## 2024-07-24 ASSESSMENT — PATIENT HEALTH QUESTIONNAIRE - PHQ9
SUM OF ALL RESPONSES TO PHQ9 QUESTIONS 1 AND 2: 2
10. IF YOU CHECKED OFF ANY PROBLEMS, HOW DIFFICULT HAVE THESE PROBLEMS MADE IT FOR YOU TO DO YOUR WORK, TAKE CARE OF THINGS AT HOME, OR GET ALONG WITH OTHER PEOPLE: SOMEWHAT DIFFICULT
1. LITTLE INTEREST OR PLEASURE IN DOING THINGS: SEVERAL DAYS
2. FEELING DOWN, DEPRESSED OR HOPELESS: SEVERAL DAYS

## 2024-07-24 ASSESSMENT — PAIN SCALES - GENERAL: PAINLEVEL: 0-NO PAIN

## 2024-07-24 NOTE — PATIENT INSTRUCTIONS
"It was a pleasure seeing you today.     USTEP walker is helpful.    Miralax, magnesium citrate, milk of magnesia and hydrate are helpful for controlling constipation.      Please make a follow up appointment in 4-6 months.     For any urgent issues or needing to speak to a medical assistant please call [phone], option 6 during our office hours Monday-Friday 8am-4pm, and leave a voicemail with your concern.  My office will try to reach back you as soon as possible within 24 (business) hours.  If you have an emergency please call 911 or visit a local urgent care or nearest emergency room.      Please understand that [employer] is a useful communication tool for simple \"normal\" results or a refill request but I would not recommend using this tool for emergent or urgent issues or for conversations with me.  I am happy to ask my staff to rearrange a follow up visit or a virtual visit sooner than requested if appropriate for your care.    "

## 2024-07-24 NOTE — PROGRESS NOTES
Annual Comprehensive Medical Exam and annual Medicare wellness visit    78 y.o. male presents for annual comprehensive medical evaluation and preventive services screening.  No recent hospitalizations, surgeries or significant injuries.  Brother and sister-in-law are both present during the exam and provide additional history.    HPI    Urinary incontinence causing perineal skin irritation.  Patient using antifungal powder and a depends brief to maintain skin integrity.  BPH medications have not significantly improved the situation.  BPH w LUTS - saw Dr. Worley today.  Flomax increased to 0.8 mg.  Continue Proscar 5 mg daily    Wheelchari for longer distances;  walker for shorter distances.  Continues to have falls at the facility.  Some involve improper gait, Parkinson's gait, lack of strength and possibly some Orthostatic or Parkinson's autonomic hypotension.    History of chronic constipation.  Currently patient is taking Colace twice daily and MiraLAX once daily but still has a bowel movement only once every 5 days with a pasty consistency.  He does not feel as though he is having a complete emptying of the colon.    Weight loss: Patient states that his appetite has decreased.  He is not eating as much and therefore has lost weight.  Review of patient's chart indicates a 23 pound weight loss since July 2023.    Dr. 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  Dr. Daniel, neurologist, is managing Parkinson's disease  Dr. Carbajal, urology, is managing BPH and urinary incontinence.    Patient resides at [facility] in [place], [place].  All medications will need to be sent to the long-term care pharmacy.  I will be prescribing medications that are not managed by the above specialists.    Throughout the year, faxes are received and addressed from [facility] nurses to assist with patient's wellbeing.  His brother lives in [place] most of the  year.          Past Medical History:   Diagnosis Date    Abnormality of gait due to impairment of balance 2023    BPH (benign prostatic hyperplasia) 2023    Chronic constipation 2023    COVID-19 2020    COVID-19 virus infection    COVID-19 2020    Pneumonia due to severe acute respiratory syndrome coronavirus 2 (SARS-CoV-2)    Debility 2023    Depressed 2016    Elevated prostate specific antigen (PSA) 2016    Abnormal PSA    GERD (gastroesophageal reflux disease) 2023    Hyperlipidemia 2023    Multiple falls 2023    Neurocognitive deficits 2023    Obsessive-compulsive behavior 2023    Osteoarthritis, generalized 2023    Other drug induced secondary parkinsonism (Multi) 2019    Drug-induced parkinsonism    Parkinsons disease (Multi) 2023    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    Right bundle branch block (RBBB) on electrocardiography 2023    Schizoaffective disorder, mixed type (Multi) 2023    Unilateral inguinal hernia, without obstruction or gangrene, not specified as recurrent     Inguinal hernia      Past Surgical History:   Procedure Laterality Date    HERNIA REPAIR  2014    Inguinal Hernia Repair    OTHER SURGICAL HISTORY  2014    Cardiovascular Stress Test    OTHER SURGICAL HISTORY  2019    Mohs surgery     Family History   Problem Relation Name Age of Onset    Heart failure Mother      Heart failure Father      Bipolar disorder Mother's Sister        Social History     Socioeconomic History    Marital status:      Spouse name: Not on file    Number of children: Not on file    Years of education: Not on file    Highest education level: Not on file   Occupational History    Not on file   Tobacco Use    Smoking status: Former     Current packs/day: 0.00     Types: Cigarettes     Quit date:      Years since quittin.6    Smokeless tobacco:  Never   Vaping Use    Vaping status: Never Used   Substance and Sexual Activity    Alcohol use: Not Currently     Comment: rarely    Drug use: Never    Sexual activity: Not on file   Other Topics Concern    Not on file   Social History Narrative    Not on file     Social Determinants of Health     Financial Resource Strain: Low Risk  (7/16/2024)    Overall Financial Resource Strain (CARDIA)     Difficulty of Paying Living Expenses: Not hard at all   Food Insecurity: No Food Insecurity (7/16/2024)    Hunger Vital Sign     Worried About Running Out of Food in the Last Year: Never true     Ran Out of Food in the Last Year: Never true   Transportation Needs: No Transportation Needs (7/16/2024)    PRAPARE - Transportation     Lack of Transportation (Medical): No     Lack of Transportation (Non-Medical): No   Physical Activity: Insufficiently Active (7/16/2024)    Exercise Vital Sign     Days of Exercise per Week: 3 days     Minutes of Exercise per Session: 20 min   Stress: No Stress Concern Present (7/16/2024)    [employer] - Occupational Stress Questionnaire     Feeling of Stress : Only a little   Social Connections: Moderately Integrated (7/16/2024)    Social Connection and Isolation Panel [NHANES]     Frequency of Communication with Friends and Family: More than three times a week     Frequency of Social Gatherings with Friends and Family: More than three times a week     Attends Synagogue Services: More than 4 times per year     Active Member of Clubs or Organizations: Yes     Attends Club or Organization Meetings: More than 4 times per year     Marital Status:    Intimate Partner Violence: Not At Risk (7/16/2024)    Humiliation, Afraid, Rape, and Kick questionnaire     Fear of Current or Ex-Partner: No     Emotionally Abused: No     Physically Abused: No     Sexually Abused: No   Housing Stability: Low Risk  (7/16/2024)    Housing Stability Vital Sign     Unable to Pay for  Housing in the Last Year: No     Number of Times Moved in the Last Year: 1     Homeless in the Last Year: No       Current Outpatient Medications on File Prior to Visit   Medication Sig Dispense Refill    acetaminophen (Tylenol) 325 mg tablet Take 1 tablet (325 mg) by mouth. Give 2 tablets by mouth every 6 hrs as needed for pain; elevated temp.      carbidopa-levodopa (Sinemet)  mg tablet Take by mouth 3 times a day. 1.5 tablet by mouth three times a day      divalproex (Depakote) 500 mg EC tablet Take 1 tablet (500 mg) by mouth once daily at bedtime. 90 tablet 1    QUEtiapine (SEROquel) 25 mg tablet Take 1 tablet (25 mg) by mouth once daily at bedtime. 90 tablet 1    zinc gluconate 50 mg tablet Take by mouth.      [DISCONTINUED] albuterol 90 mcg/actuation inhaler Inhale 2 puffs 4 times a day.      [DISCONTINUED] ascorbic acid (Vitamin C) 500 mg chewable tablet Chew 1 tablet (500 mg) 2 times a day.      [DISCONTINUED] aspirin 81 mg EC tablet Take 1 tablet (81 mg) by mouth once daily. Give 1 tablet orally one time a day      [DISCONTINUED] cyanocobalamin (Vitamin B-12) 1,000 mcg tablet Take 1 tablet (1,000 mcg) by mouth once daily.      [DISCONTINUED] dextromethorphan-guaifenesin (Robitussin DM)  mg/5 mL syrup Take 5 mL by mouth if needed for cough. Give 10 mL by mouth every 4 hours prn cough      [DISCONTINUED] docusate sodium (Colace) 100 mg capsule Take 1 capsule (100 mg) by mouth twice a day. 1 capsule by mouth every 8 hrs prn constipation      [DISCONTINUED] ergocalciferol (Vitamin D-2) 1.25 MG (40321 UT) capsule Take 1 capsule (1,250 mcg) by mouth 1 (one) time per week. 12 capsule 3    [DISCONTINUED] escitalopram (Lexapro) 5 mg tablet Take 5 mg daily for 10 days then stop. 10 tablet 0    [DISCONTINUED] finasteride (Proscar) 5 mg tablet Take 1 tablet (5 mg) by mouth once daily.      [DISCONTINUED] folic acid/multivit-min/lutein (CENTRUM SILVER ORAL) Take by mouth.      [DISCONTINUED]  "ipratropium-albuteroL (Duo-Neb) 0.5-2.5 mg/3 mL nebulizer solution Take 3 mL by nebulization 3 times a day. 1 vial inhaled orally via nebulizer every 6 hrs prn shortness of breath, wheezing      [DISCONTINUED] melatonin 3 mg capsule Take by mouth once daily as needed.      [DISCONTINUED] meloxicam (Mobic) 7.5 mg tablet Take 1 tablet (7.5 mg) by mouth once daily. 90 tablet 3    [DISCONTINUED] nystatin (Mycostatin) 100,000 unit/gram powder Apply twice daily to intertriginous/skin fold rash until resolved 60 g 1    [DISCONTINUED] pantoprazole (ProtoNix) 40 mg EC tablet Take 1 tablet (40 mg) by mouth once daily. 90 tablet 3    [DISCONTINUED] polyethylene glycol (Glycolax, Miralax) 17 gram/dose powder Take 17 g by mouth once daily. Give 17 gram by mouth one time a day every other day for constipation 1700 g 3    [DISCONTINUED] pravastatin (Pravachol) 40 mg tablet Take 1 tablet (40 mg) by mouth once daily. 90 tablet 3    [DISCONTINUED] tamsulosin (Flomax) 0.4 mg 24 hr capsule Take 1 capsule (0.4 mg) by mouth 2 times a day.       No current facility-administered medications on file prior to visit.       No Known Allergies      Review of Systems:  Complete review of systems is negative today except for that mentioned in the history of present illness.  In particular patient denies chest pain, shortness of breath, headaches and GI disturbances.      Visit Vitals  /69   Pulse 102   Ht 1.778 m (5' 10\")   Wt 94.8 kg (209 lb)   SpO2 94%   BMI 29.99 kg/m²   Smoking Status Former   BSA 2.16 m²      Physical Exam  Gen: Alert and oriented to himself, location and family members present. no acute distress/agitation.  HEENT: Head is normocephalic.  Extraocular muscles are intact.  Tympanic membranes are clear.   Neck is supple without adenopathy or carotid bruits.  No masses or thyromegaly  Heart: Regular rate and rhythm without murmurs.  Lungs: Clear to auscultation bilaterally.  Abdomen: Soft with normal bowel sounds.  No " masses or pain to palpation.  No bruits auscultated.  Extremities: Good range of motion of all joints.  No significant edema of the lower extremities  Neuro: No signs of focal neurologic deficit.  Mild resting tremor of both upper extremities.  Speech and hearing are normal.  Muscle Strength +5/5.  Patient was able to independently stand from a chair and walk a short distance to the exam table in a guarded manner.  Ambulates in the office with a walker, short steps.  Musculoskeletal: Spine with decreased ROM.  No scoliosis.    Skin: No significant or irregular nevi visualized.  Psych: Dull affect.  No suicidal ideation.  Impaired judgement and insight.     The 10-year ASCVD risk score (Jordan GALVAN, et al., 2019) is: 28.2%    Values used to calculate the score:      Age: 78 years      Sex: Male      Is Non- : No      Diabetic: No      Tobacco smoker: No      Systolic Blood Pressure: 124 mmHg      Is BP treated: No      HDL Cholesterol: 46.3 mg/dL      Total Cholesterol: 138 mg/dL  I discussed face-to-face with this individual and his brother the cardiovascular risk and behavioral therapies of nutritional choices, exercise and elimination of habits contributing to risk.  We agreed on a plan how they may be able to reduce their current cardiovascular risk.  For patients with risk calculation greater than 10%, aspirin use was discussed and encouraged unless known allergy or increased risk of bleeding contraindicate use.  15 minutes.    Detailed medication review and refill.  Lab testing: Blood was drawn in the office today for all indicated tests except Depakote due to specialized tube requirement.  Patient will be taken to a  facility to have this test done today.    DIAGNOSIS/PLAN  1. Health maintenance examination  - ascorbic acid (Vitamin C) 500 mg chewable tablet; Chew 1 tablet (500 mg) 2 times a day.  Dispense: 180 tablet; Refill: 3  - aspirin 81 mg EC tablet; Take 1 tablet (81 mg) by mouth  once daily. Give 1 tablet orally one time a day  Dispense: 90 tablet; Refill: 3  - cyanocobalamin (Vitamin B-12) 1,000 mcg tablet; Take 1 tablet (1,000 mcg) by mouth once daily.  Dispense: 90 tablet; Refill: 3  - multivitamin with minerals iron-free (Centrum Silver); Take 1 tablet by mouth once daily.  Dispense: 90 tablet; Refill: 3  - melatonin 3 mg capsule; Take 3 mg by mouth once daily.  Dispense: 90 capsule; Refill: 3    Discontinue zinc supplement  Discontinue ProAir HFA, dextromethorphan-guaifenesin cough suppressant    2. Medicare annual wellness visit, subsequent  Living Will / Advanced Care Planning: I spent more than 15 minutes discussing advance care planning including explanation and discussion of advanced directives.  If patient does not have current up-to-date documents, examples and information were provided on how to create both living will and power of .  Patient was encouraged to work on completing these documents.        3. Hyperlipidemia, unspecified hyperlipidemia type  - Comprehensive Metabolic Panel  - Lipid Panel  - pravastatin (Pravachol) 40 mg tablet; Take 1 tablet (40 mg) by mouth once daily.  Dispense: 90 tablet; Refill: 3    4. Osteoarthritis, generalized  - meloxicam (Mobic) 7.5 mg tablet; Take 1 tablet (7.5 mg) by mouth once daily.  Dispense: 90 tablet; Refill: 3    5. Intertrigo of genitocrural region due to Candida species  Related to urinary incontinence which is  Managed by urology  - nystatin (Mycostatin) 100,000 unit/gram powder; Apply twice daily to intertriginous/skin fold rash until resolved  Dispense: 60 g; Refill: 11    6. Gastroesophageal reflux disease without esophagitis  - pantoprazole (ProtoNix) 40 mg EC tablet; Take 1 tablet (40 mg) by mouth once daily.  Dispense: 90 tablet; Refill: 3    7. Chronic idiopathic constipation  -Discontinue Colace 100 mg twice daily.  Start Senokot as.  Continue MiraLAX as ordered    - sennosides-docusate sodium (Senokot-S) 8.6-50  mg tablet; Take 1 tablet by mouth 2 times a day.  Dispense: 180 tablet; Refill: 3  - polyethylene glycol (Glycolax, Miralax) 17 gram/dose powder; Take 17 g by mouth once daily. Give 17 gram by mouth one time a day every other day for constipation  Dispense: 1700 g; Refill: 3    8. 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 Drake)    10. Schizoaffective disorder, mixed type (Multi)  Management by psychiatry    11. Obsessive-compulsive behavior  Management by psychiatry    12. Neurocognitive deficits  Continue management by psychiatry    13. Multiple falls  Recommend additional physical therapy for strengthening and mobility    14. 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 Vitamin D deficiency  - CBC  - Vitamin D 25-Hydroxy,Total (for eval of Vitamin D levels)  - ergocalciferol (Vitamin D-2) 1.25 MG (96230 UT) capsule; Take 1 capsule (1,250 mcg) by mouth 1 (one) time per week.  Dispense: 12 capsule; Refill: 3    16. Encounter for therapeutic drug level monitoring  - Valproic Acid; Future    17. Slowing of urinary stream  - Prostate Specific Antigen    18. Benign prostatic hyperplasia with urinary frequency  -Continue management by urology.  - finasteride (Proscar) 5 mg tablet; Take 1 tablet (5 mg) by mouth once daily.  Dispense: 90 tablet; Refill: 3  - tamsulosin (Flomax) 0.4 mg 24 hr capsule; Take 1 capsule (0.4 mg) by mouth 2 times a day.  Dispense: 180 capsule; Refill: 3          Follow up in 12 months for annual comprehensive medical evaluation and preventive services screening.    I will continue to monitor, evaluate, assess and treat all problems/diagnoses as appropriate and continue to collaborate with specialists.    Contact office or send a  MY Chart message with any questions  or concerns    Encouraged to sign up with my  My Chart  Patient will only be notified of labs that require medical intervention.    Prescriptions will not be filled unless you are compliant with your follow up appointments or have a follow up appointment scheduled as per instruction of your physician. Refills should be requested at the time of your visit.    **Charting was completed using voice recognition technology and may include unintended errors**    Marcus Bender DO, PABLO  [id number] [facility], [id number]  [place] [id number] [phone]  Marcus Bender DO, PABLO

## 2024-07-24 NOTE — PROGRESS NOTES
Subjective      Zachery Fontaine is a 78 y.o. year old male is here for follow up for parkinsonism.     HPI  Last visit was one year ago, was lost to follow up.  Arrives with son today.  He has had almost near falls.  Has freezing episodes.   Symptoms seem improved in the afternoon.  Sleep interruptions due to urinary frequency.  Saw urology today and flomax is being increased.  He does have lightheadedness even when sitting.   Memory is not good.  He has gone to chair exercises sometimes.  BP has been variable, mostly low.     Current PD Meds:   Sinemet 25-100mg 1.5 tabs three times daily. ( 7am/ 12pm/5pm)    He is still on Depakote/ Quetiapine for schizophrenia.      since 2016 he began having walking issues.      Abilify - was on this from Dec -July 2019. prior to that- Olanzapine was on for few years - stopped in Dec- was on for 2 years. Haldol was given at one point, early [id number].   tremors since 2015 of both arms. tremors are getting worse.   memory is okay.      FH: maternal grandmother had tremors.   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)    Right bundle branch block (RBBB) on electrocardiography    Schizoaffective disorder, mixed type (Multi)    Uncontrolled REM sleep behavior disorder    Venous insufficiency    Vitamin D deficiency    Chronic constipation     Past Medical History:   Diagnosis Date    Acute bronchitis, unspecified 01/02/2015    Bronchitis with bronchospasm    COVID-19 04/09/2020     COVID-19 virus infection    COVID-19 2020    Pneumonia due to severe acute respiratory syndrome coronavirus 2 (SARS-CoV-2)    Elevated prostate specific antigen (PSA) 2016    Abnormal PSA    Male erectile dysfunction, unspecified     Erectile dysfunction    Other drug induced secondary parkinsonism (Multi) 2019    Drug-induced parkinsonism    Personal history of other diseases of male genital organs     History of testicular mass    Personal history of other diseases of the digestive system     History of hiatal hernia    Personal history of other medical treatment     History of nuclear stress test    Personal history of other mental and behavioral disorders 2019    History of paranoid schizophrenia    Unilateral inguinal hernia, without obstruction or gangrene, not specified as recurrent     Inguinal hernia     Past Surgical History:   Procedure Laterality Date    HERNIA REPAIR  2014    Inguinal Hernia Repair    OTHER SURGICAL HISTORY  2014    Cardiovascular Stress Test    OTHER SURGICAL HISTORY  2019    Mohs surgery     Social History     Tobacco Use    Smoking status: Former     Current packs/day: 0.00     Types: Cigarettes     Quit date: 1960     Years since quittin.6    Smokeless tobacco: Never   Substance Use Topics    Alcohol use: Yes     Comment: rarely     family history includes Bipolar disorder in his mother's sister; Heart failure in his father and mother.    Current Outpatient Medications:     acetaminophen (Tylenol) 325 mg tablet, Take 1 tablet (325 mg) by mouth. Give 2 tablets by mouth every 6 hrs as needed for pain; elevated temp., Disp: , Rfl:     albuterol 90 mcg/actuation inhaler, Inhale 2 puffs 4 times a day., Disp: , Rfl:     ascorbic acid (Vitamin C) 500 mg chewable tablet, Chew 1 tablet (500 mg) 2 times a day., Disp: , Rfl:     aspirin 81 mg EC tablet, Take 1 tablet (81 mg) by mouth once daily. Give 1 tablet orally one time a day, Disp: , Rfl:      carbidopa-levodopa (Sinemet)  mg tablet, Take by mouth 3 times a day. 1.5 tablet by mouth three times a day, Disp: , Rfl:     cyanocobalamin (Vitamin B-12) 1,000 mcg tablet, Take 1 tablet (1,000 mcg) by mouth once daily., Disp: , Rfl:     dextromethorphan-guaifenesin (Robitussin DM)  mg/5 mL syrup, Take 5 mL by mouth if needed for cough. Give 10 mL by mouth every 4 hours prn cough, Disp: , Rfl:     divalproex (Depakote) 500 mg EC tablet, Take 1 tablet (500 mg) by mouth once daily at bedtime., Disp: 90 tablet, Rfl: 1    docusate sodium (Colace) 100 mg capsule, Take 1 capsule (100 mg) by mouth twice a day. 1 capsule by mouth every 8 hrs prn constipation, Disp: , Rfl:     ergocalciferol (Vitamin D-2) 1.25 MG (36440 UT) capsule, Take 1 capsule (1,250 mcg) by mouth 1 (one) time per week., Disp: 12 capsule, Rfl: 3    escitalopram (Lexapro) 5 mg tablet, Take 5 mg daily for 10 days then stop., Disp: 10 tablet, Rfl: 0    finasteride (Proscar) 5 mg tablet, Take 1 tablet (5 mg) by mouth once daily., Disp: , Rfl:     folic acid/multivit-min/lutein (CENTRUM SILVER ORAL), Take by mouth., Disp: , Rfl:     ipratropium-albuteroL (Duo-Neb) 0.5-2.5 mg/3 mL nebulizer solution, Take 3 mL by nebulization 3 times a day. 1 vial inhaled orally via nebulizer every 6 hrs prn shortness of breath, wheezing, Disp: , Rfl:     melatonin 3 mg capsule, Take by mouth., Disp: , Rfl:     meloxicam (Mobic) 7.5 mg tablet, Take 1 tablet (7.5 mg) by mouth once daily., Disp: 90 tablet, Rfl: 3    nystatin (Mycostatin) 100,000 unit/gram powder, Apply twice daily to intertriginous/skin fold rash until resolved, Disp: 60 g, Rfl: 1    pantoprazole (ProtoNix) 40 mg EC tablet, Take 1 tablet (40 mg) by mouth once daily., Disp: 90 tablet, Rfl: 3    polyethylene glycol (Glycolax, Miralax) 17 gram/dose powder, Take 17 g by mouth once daily. Give 17 gram by mouth one time a day every other day for constipation, Disp: 1700 g, Rfl: 3    pravastatin  (Pravachol) 40 mg tablet, Take 1 tablet (40 mg) by mouth once daily., Disp: 90 tablet, Rfl: 3    QUEtiapine (SEROquel) 25 mg tablet, Take 1 tablet (25 mg) by mouth once daily at bedtime., Disp: 90 tablet, Rfl: 1    tamsulosin (Flomax) 0.4 mg 24 hr capsule, Take 1 capsule (0.4 mg) by mouth once daily., Disp: , Rfl:     zinc gluconate 50 mg tablet, Take by mouth., Disp: , Rfl:   No Known Allergies  There were no vitals taken for this visit.  Neurological Exam/Physical Exam:    Constitutional: General appearance: no acute distress. Pleasant. Obese. Hypomimia. In wheelchair.   Auscultation of Heart: Regular rate and rhythm, no murmurs, normal S1 and S2.   Carotid Arteries: no bruits  Peripheral Vascular Exam: No swelling, edema or tenderness to palpation in extremities  Mental status: no distress, alert, interactive and cooperative. Affect is appropriate.   Orientation: oriented to person, oriented to place and oriented to time.   Memory: recent memory impaired.   Eyes: The ophthalmoscopic examination was normal.   Cranial nerve II: Visual fields full to confrontation.   Cranial nerves III, IV, and VI: Pupils round, equally reactive to light; EOMs intact. No nystagmus.   Cranial Nerve V: Facial sensation intact to LT bilaterally.   Cranial nerve VII: no facial droop  Cranial nerve VIII: Hearing is intact  Cranial nerves IX and X: Palate elevates symmetrically.   Cranial nerve XI: Shoulder shrug intact.   Cranial nerve XII: Tongue is midline.  Motor:  Muscle bulk was normal in both upper and lower extremities.    resting tremor in both hands, moderate - severe bradykinesia in all ext. rigidity worse on L >R. mild myoclonic jerking of UE randomly when doing things.   Deep Tendon Reflexes: left biceps 2+ , right biceps 2+, left triceps 2+, right triceps 2+, left brachioradialis 2+, right brachioradialis 2+, left patella 2+, right patella 2+, left ankle jerk 2+, right ankle jerk 2+   Plantar Reflex: Toes downgoing to  plantar stimulation on the left. Toes downgoing to plantar stimulation on the right.   Sensory Exam: Normal to vibratory sensation  Coordination:  no limb dystaxia  Gait:: hard to initiate gait . Freezing is moderate.        Labs:  CBC:   Lab Results   Component Value Date    WBC 6.5 07/11/2023    HGB 14.6 07/11/2023    HCT 44.0 07/11/2023     07/11/2023     BMP:   Lab Results   Component Value Date     07/11/2023    K 4.3 07/11/2023     07/11/2023    CO2 28 07/11/2023    BUN 24 (H) 07/11/2023    CREATININE 1.34 (H) 07/11/2023    CALCIUM 9.7 07/11/2023     LFT:   Lab Results   Component Value Date    ALKPHOS 50 07/11/2023    BILITOT 1.1 07/11/2023    PROT 7.2 07/11/2023    ALBUMIN 4.0 07/11/2023    ALT 9 (L) 07/11/2023    AST 14 07/11/2023         Assessment/Plan   Problem List Items Addressed This Visit             ICD-10-CM    Parkinsons disease (Multi) - Primary G20.A1     This is a chronic neurologic condition that requires ongoing care and monitoring. This is a complex, serious condition that needs long term care going forward. Between myself and the patient we will be changing direction of care depending on responses to treatment.   Today we discussed medication options, non medication options for management and various other symptoms that are in relation to this disease.  I will continue to be involved in the care of this patient.     Follows with psychiatry for depression, medications being adjusted.     PT for balance.  U STEP walker.   Continue Sinemet as is, will not increase due to lightheadedness/ low BP at times. Encouraged hydration.

## 2024-07-25 LAB
25(OH)D3 SERPL-MCNC: 56 NG/ML (ref 30–100)
ALBUMIN SERPL BCP-MCNC: 4.2 G/DL (ref 3.4–5)
ALP SERPL-CCNC: 57 U/L (ref 33–136)
ALT SERPL W P-5'-P-CCNC: 14 U/L (ref 10–52)
ANION GAP SERPL CALC-SCNC: 12 MMOL/L (ref 10–20)
AST SERPL W P-5'-P-CCNC: 13 U/L (ref 9–39)
BILIRUB SERPL-MCNC: 0.8 MG/DL (ref 0–1.2)
BUN SERPL-MCNC: 24 MG/DL (ref 6–23)
CALCIUM SERPL-MCNC: 10.2 MG/DL (ref 8.6–10.3)
CHLORIDE SERPL-SCNC: 103 MMOL/L (ref 98–107)
CHOLEST SERPL-MCNC: 138 MG/DL (ref 0–199)
CHOLESTEROL/HDL RATIO: 2.8
CO2 SERPL-SCNC: 28 MMOL/L (ref 21–32)
CREAT SERPL-MCNC: 1.18 MG/DL (ref 0.5–1.3)
EGFRCR SERPLBLD CKD-EPI 2021: 63 ML/MIN/1.73M*2
ERYTHROCYTE [DISTWIDTH] IN BLOOD BY AUTOMATED COUNT: 13.8 % (ref 11.5–14.5)
GLUCOSE SERPL-MCNC: 104 MG/DL (ref 74–99)
HCT VFR BLD AUTO: 44.3 % (ref 41–52)
HDLC SERPL-MCNC: 49.1 MG/DL
HGB BLD-MCNC: 14.8 G/DL (ref 13.5–17.5)
LDLC SERPL CALC-MCNC: 67 MG/DL
MCH RBC QN AUTO: 33.1 PG (ref 26–34)
MCHC RBC AUTO-ENTMCNC: 33.4 G/DL (ref 32–36)
MCV RBC AUTO: 99 FL (ref 80–100)
NON HDL CHOLESTEROL: 89 MG/DL (ref 0–149)
NON-UH HIE DATE LAST DOSE: NORMAL
NON-UH HIE TIME LAST DOSE: NORMAL
NON-UH HIE VALPROIC ACID: 51.5 UG/ML (ref 50–100)
NRBC BLD-RTO: 0 /100 WBCS (ref 0–0)
PLATELET # BLD AUTO: 210 X10*3/UL (ref 150–450)
POTASSIUM SERPL-SCNC: 5 MMOL/L (ref 3.5–5.3)
PROT SERPL-MCNC: 6.9 G/DL (ref 6.4–8.2)
PSA SERPL-MCNC: 2.65 NG/ML
RBC # BLD AUTO: 4.47 X10*6/UL (ref 4.5–5.9)
SODIUM SERPL-SCNC: 138 MMOL/L (ref 136–145)
TESTOST SERPL-MCNC: 308 NG/DL (ref 240–1000)
TRIGL SERPL-MCNC: 111 MG/DL (ref 0–149)
TSH SERPL-ACNC: 1.4 MIU/L (ref 0.44–3.98)
VLDL: 22 MG/DL (ref 0–40)
WBC # BLD AUTO: 8 X10*3/UL (ref 4.4–11.3)

## 2024-08-06 ENCOUNTER — TELEMEDICINE (OUTPATIENT)
Dept: BEHAVIORAL HEALTH | Facility: CLINIC | Age: 79
End: 2024-08-06
Payer: MEDICARE

## 2024-08-06 DIAGNOSIS — F41.8 MIXED ANXIETY AND DEPRESSIVE DISORDER: ICD-10-CM

## 2024-08-06 DIAGNOSIS — R46.81 OBSESSIVE-COMPULSIVE BEHAVIOR: ICD-10-CM

## 2024-08-06 DIAGNOSIS — F25.0 SCHIZOAFFECTIVE DISORDER, MIXED TYPE (MULTI): ICD-10-CM

## 2024-08-06 PROCEDURE — 1123F ACP DISCUSS/DSCN MKR DOCD: CPT | Performed by: PSYCHIATRY & NEUROLOGY

## 2024-08-06 PROCEDURE — 1159F MED LIST DOCD IN RCRD: CPT | Performed by: PSYCHIATRY & NEUROLOGY

## 2024-08-06 PROCEDURE — 1160F RVW MEDS BY RX/DR IN RCRD: CPT | Performed by: PSYCHIATRY & NEUROLOGY

## 2024-08-06 PROCEDURE — 99214 OFFICE O/P EST MOD 30 MIN: CPT | Performed by: PSYCHIATRY & NEUROLOGY

## 2024-08-06 PROCEDURE — 1157F ADVNC CARE PLAN IN RCRD: CPT | Performed by: PSYCHIATRY & NEUROLOGY

## 2024-08-06 RX ORDER — SERTRALINE HYDROCHLORIDE 25 MG/1
TABLET, FILM COATED ORAL
Qty: 30 TABLET | Refills: 1 | Status: SHIPPED | OUTPATIENT
Start: 2024-08-06

## 2024-08-06 ASSESSMENT — ENCOUNTER SYMPTOMS
DEPRESSED MOOD: 1
FATIGUE: 1

## 2024-08-06 NOTE — PROGRESS NOTES
Subjective   Patient ID: Zachery Fontaine is a 78 y.o. male who presents for No chief complaint on file..    The patient engaged in a telehealth session via Epic audio visual or phone with this provider practicing within the [facility]. The identity of the patient was verified by their date of birth and last four digits of their social security number. The provider demonstrated that confidentially was preserved at their location. The patient was informed that they were responsible for ensuring confidentially was secured at their location. The patient's location was documented for emergency purposes. The patient was informed of the necessary steps that would occur if an emergency was to occur or technology failed during session.     HPI: The patient is seen with his brother who is his guardian and his sister in law. The patient reports feeling tired and mildly down.     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)      Comment:  Drug-induced parkinsonism  No date: Personal history of other diseases of male genital organs      Comment:  History of testicular mass  No date: Personal history of other diseases of the digestive system      Comment:  History of hiatal hernia  No date: Personal history of other medical treatment      Comment:  History of nuclear stress test  07/16/2019: Personal history of other mental and behavioral disorders      Comment:  History of paranoid schizophrenia  No date: Unilateral inguinal hernia, without  obstruction or gangrene,   not specified as recurrent      Comment:  Inguinal hernia      MSE: The patient is alert, fully oriented, language is intact, and recent and remote memory intact. The patient denies any suicidal or homicidal ideation or plans. The patient presents with mild depressive features without manic or psychotic symptoms. Thought is clear. Judgment and insight are limited. No behavioral disturbances are present on examination.          Review of Systems   Constitutional:  Positive for fatigue.   Neurological:         MSE: The patient is alert, fully oriented, language is intact, and recent and remote memory intact. The patient denies any suicidal or homicidal ideation or plans. The patient presents with no depressive, manic or psychotic symptoms. Thought is logical and clear. Judgment and insight are limited. No behavioral disturbances are present on examination.     Psychiatric/Behavioral:          MSE: The patient is alert, fully oriented, language is intact, and recent and remote memory intact. The patient denies any suicidal or homicidal ideation or plans. The patient presents with no depressive, manic or psychotic symptoms. Thought is logical and clear. Judgment and insight are limited. No behavioral disturbances are present on examination.       Psych Review of Symptoms:    ADHD: Patient denied any symptoms.         Anxiety: Patient denied any symptoms.         Developmental and Sensory Concerns: Patient denied any symptoms.         Depressive Symptoms:   Depressed mood, decreased interest and fatigue.       Disruptive and Conduct Symptoms: Patient denied any symptoms.         Eating / Feeding Concerns: Patient denied any symptoms.         Elimination Symptoms: Patient denied any symptoms.         Manic Symptoms: Patient denied any symptoms.         Obsessive-Compulsive Symptoms: Patient denied any symptoms.         Psychotic Symptoms: Patient denied any symptoms.           Trauma Related  Symptoms: Patient denied any symptoms.           Sleep Concerns: Patient denied any symptoms.             Objective   Physical Exam  Neurological:      General: No focal deficit present.      Mental Status: He is oriented to person, place, and time. Mental status is at baseline.      Comments: MSE: The patient is alert, fully oriented, language is intact, and recent and remote memory intact. The patient denies any suicidal or homicidal ideation or plans. The patient presents with no depressive, manic or psychotic symptoms. Thought is logical and clear. Judgment and insight are limited. No behavioral disturbances are present on examination.     Psychiatric:      Comments: MSE: The patient is alert, fully oriented, language is intact, and recent and remote memory intact. The patient denies any suicidal or homicidal ideation or plans. The patient presents with no depressive, manic or psychotic symptoms. Thought is logical and clear. Judgment and insight are limited. No behavioral disturbances are present on examination.           Lab Review:   Orders Only on 07/25/2024   Component Date Value    [facility] HIE Valproic Acid 07/25/2024 51.5     [facility] Time Last Dose 07/25/2024 8PM     [facility] Date Last Dose 07/25/2024 7/24/24    Office Visit on 07/24/2024   Component Date Value    WBC 07/24/2024 8.0     nRBC 07/24/2024 0.0     RBC 07/24/2024 4.47 (L)     Hemoglobin 07/24/2024 14.8     Hematocrit 07/24/2024 44.3     MCV 07/24/2024 99     MCH 07/24/2024 33.1     MCHC 07/24/2024 33.4     RDW 07/24/2024 13.8     Platelets 07/24/2024 210     Glucose 07/24/2024 104 (H)     Sodium 07/24/2024 138     Potassium 07/24/2024 5.0     Chloride 07/24/2024 103     Bicarbonate 07/24/2024 28     Anion Gap 07/24/2024 12     Urea Nitrogen 07/24/2024 24 (H)     Creatinine 07/24/2024 1.18     eGFR 07/24/2024 63     Calcium 07/24/2024 10.2     Albumin 07/24/2024 4.2     Alkaline Phosphatase 07/24/2024 57     Total Protein 07/24/2024 6.9      AST 07/24/2024 13     Bilirubin, Total 07/24/2024 0.8     ALT 07/24/2024 14     Cholesterol 07/24/2024 138     HDL-Cholesterol 07/24/2024 49.1     Cholesterol/HDL Ratio 07/24/2024 2.8     LDL Calculated 07/24/2024 67     VLDL 07/24/2024 22     Triglycerides 07/24/2024 111     Non HDL Cholesterol 07/24/2024 89     Prostate Specific AG 07/24/2024 2.65     Testosterone 07/24/2024 308     Thyroid Stimulating Horm* 07/24/2024 1.40     Vitamin D, 25-Hydroxy, T* 07/24/2024 56        Assessment/Plan   Psychiatric Risk Assessment  Violence Risk Assessment: none  Acute Risk of Harm to Others is Considered: low   Suicide Risk Assessment: age > 65 yrs old and   Protective Factors against Suicide: adherence to  treatment, fear of suicide, moral objections to suicide, positive family relationships, and sense of responsibility toward family  Acute Risk of Harm to Self is Considered: low    Diagnosis: schizoaffective disorder in substantial remission and obsessive compulsive disorder improved.    Treatment plan:  The FDA risks, benefits & alternatives to the medications prescribed were explained to you and your support person, your brother, Marcus Fontaine, who is your guardian with your sister in law, today. You & your support persons were able to understand & repeat these risks, benefits & alternatives to these prescribed medications. You and your support persons have agreed to proceed with treatment with the medications discussed based on the conclusion that the benefit outweighs the risks of this treatment regimen: continue quetiapine 25 mg at bedtime, continue Depakote  mg for now with plan to attempt to decrease and possibly eliminate as discussed, start sertraline 25 mg with breakfast every morning.     We shall be ordering the valproate level, CBC with differential and CMP at the next appointment.    Follow up in 3 to 4 weeks.

## 2024-08-12 ENCOUNTER — APPOINTMENT (OUTPATIENT)
Dept: PRIMARY CARE | Facility: CLINIC | Age: 79
End: 2024-08-12
Payer: MEDICARE

## 2024-09-03 ENCOUNTER — TELEMEDICINE (OUTPATIENT)
Dept: BEHAVIORAL HEALTH | Facility: CLINIC | Age: 79
End: 2024-09-03
Payer: MEDICARE

## 2024-09-03 DIAGNOSIS — F25.0 SCHIZOAFFECTIVE DISORDER, MIXED TYPE (MULTI): ICD-10-CM

## 2024-09-03 PROCEDURE — 1123F ACP DISCUSS/DSCN MKR DOCD: CPT | Performed by: PSYCHIATRY & NEUROLOGY

## 2024-09-03 PROCEDURE — 99214 OFFICE O/P EST MOD 30 MIN: CPT | Performed by: PSYCHIATRY & NEUROLOGY

## 2024-09-03 PROCEDURE — 1157F ADVNC CARE PLAN IN RCRD: CPT | Performed by: PSYCHIATRY & NEUROLOGY

## 2024-09-03 ASSESSMENT — ENCOUNTER SYMPTOMS
FATIGUE: 1
DEPRESSED MOOD: 1

## 2024-09-03 NOTE — PROGRESS NOTES
Subjective   Patient ID: Zachery Fontaine is a 78 y.o. male who presents for No chief complaint on file..    The patient engaged in a telehealth session via Epic audio visual or phone with this provider practicing within the [facility]. The identity of the patient was verified by their date of birth and last four digits of their social security number. The provider demonstrated that confidentially was preserved at their location. The patient was informed that they were responsible for ensuring confidentially was secured at their location. The patient's location was documented for emergency purposes. The patient was informed of the necessary steps that would occur if an emergency was to occur or technology failed during session.     HPI: The patient is seen with his brother who is his guardian and his sister in law who are virtual from [place]. The patient reports feeling okay but appetite is down.     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)      Comment:  Drug-induced parkinsonism  No date: Personal history of other diseases of male genital organs      Comment:  History of testicular mass  No date: Personal history of other diseases of the digestive system      Comment:  History of hiatal hernia  No date: Personal history of other medical treatment      Comment:  History of nuclear stress test  07/16/2019: Personal history of other mental and behavioral disorders      Comment:  History of paranoid schizophrenia  No date: Unilateral  inguinal hernia, without obstruction or gangrene,   not specified as recurrent      Comment:  Inguinal hernia      MSE: The patient is alert, fully oriented, language is intact, and recent and remote memory intact. The patient denies any suicidal or homicidal ideation or plans. The patient presents with mild depressive features in substantial remission. without manic or psychotic symptoms. Thought is clear. Judgment and insight are limited. No behavioral disturbances are present on examination.          Review of Systems   Constitutional:  Positive for fatigue.   Neurological:         MSE: The patient is alert, fully oriented, language is intact, and recent and remote memory intact. The patient denies any suicidal or homicidal ideation or plans. The patient presents with no depressive, manic or psychotic symptoms. Thought is logical and clear. Judgment and insight are limited. No behavioral disturbances are present on examination.     Psychiatric/Behavioral:          MSE: The patient is alert, fully oriented, language is intact, and recent and remote memory intact. The patient denies any suicidal or homicidal ideation or plans. The patient presents with no depressive, manic or psychotic symptoms. Thought is logical and clear. Judgment and insight are limited. No behavioral disturbances are present on examination.       Psych Review of Symptoms:    ADHD: Patient denied any symptoms.         Anxiety: Patient denied any symptoms.         Developmental and Sensory Concerns: Patient denied any symptoms.         Depressive Symptoms:   Depressed mood, decreased interest and fatigue.       Disruptive and Conduct Symptoms: Patient denied any symptoms.         Eating / Feeding Concerns: Patient denied any symptoms.         Elimination Symptoms: Patient denied any symptoms.         Manic Symptoms: Patient denied any symptoms.         Obsessive-Compulsive Symptoms: Patient denied any symptoms.         Psychotic Symptoms:  Patient denied any symptoms.           Trauma Related Symptoms: Patient denied any symptoms.           Sleep Concerns: Patient denied any symptoms.             Objective   Physical Exam  Neurological:      General: No focal deficit present.      Mental Status: He is oriented to person, place, and time. Mental status is at baseline.      Comments: MSE: The patient is alert, fully oriented, language is intact, and recent and remote memory intact. The patient denies any suicidal or homicidal ideation or plans. The patient presents with no depressive, manic or psychotic symptoms. Thought is logical and clear. Judgment and insight are limited. No behavioral disturbances are present on examination.     Psychiatric:      Comments: MSE: The patient is alert, fully oriented, language is intact, and recent and remote memory intact. The patient denies any suicidal or homicidal ideation or plans. The patient presents with no depressive, manic or psychotic symptoms. Thought is logical and clear. Judgment and insight are limited. No behavioral disturbances are present on examination.           Lab Review:   Orders Only on 07/25/2024   Component Date Value    [facility] HIE Valproic Acid 07/25/2024 51.5     [facility] Time Last Dose 07/25/2024 8PM     [facility] Date Last Dose 07/25/2024 7/24/24    Office Visit on 07/24/2024   Component Date Value    WBC 07/24/2024 8.0     nRBC 07/24/2024 0.0     RBC 07/24/2024 4.47 (L)     Hemoglobin 07/24/2024 14.8     Hematocrit 07/24/2024 44.3     MCV 07/24/2024 99     MCH 07/24/2024 33.1     MCHC 07/24/2024 33.4     RDW 07/24/2024 13.8     Platelets 07/24/2024 210     Glucose 07/24/2024 104 (H)     Sodium 07/24/2024 138     Potassium 07/24/2024 5.0     Chloride 07/24/2024 103     Bicarbonate 07/24/2024 28     Anion Gap 07/24/2024 12     Urea Nitrogen 07/24/2024 24 (H)     Creatinine 07/24/2024 1.18     eGFR 07/24/2024 63     Calcium 07/24/2024 10.2     Albumin 07/24/2024 4.2     Alkaline  Phosphatase 07/24/2024 57     Total Protein 07/24/2024 6.9     AST 07/24/2024 13     Bilirubin, Total 07/24/2024 0.8     ALT 07/24/2024 14     Cholesterol 07/24/2024 138     HDL-Cholesterol 07/24/2024 49.1     Cholesterol/HDL Ratio 07/24/2024 2.8     LDL Calculated 07/24/2024 67     VLDL 07/24/2024 22     Triglycerides 07/24/2024 111     Non HDL Cholesterol 07/24/2024 89     Prostate Specific AG 07/24/2024 2.65     Testosterone 07/24/2024 308     Thyroid Stimulating Horm* 07/24/2024 1.40     Vitamin D, 25-Hydroxy, T* 07/24/2024 56        Assessment/Plan   Psychiatric Risk Assessment  Violence Risk Assessment: none  Acute Risk of Harm to Others is Considered: low   Suicide Risk Assessment: age > 65 yrs old and   Protective Factors against Suicide: adherence to  treatment, fear of suicide, moral objections to suicide, positive family relationships, and sense of responsibility toward family  Acute Risk of Harm to Self is Considered: low    Diagnosis: schizoaffective disorder in substantial remission and obsessive compulsive disorder improved.    Treatment plan:  The FDA risks, benefits & alternatives to the medications prescribed were explained to you and your support person, your brother, Marcus Fontaine, who is your guardian with your sister in law, today. You & your support persons were able to understand & repeat these risks, benefits & alternatives to these prescribed medications. You and your support persons have agreed to proceed with treatment with the medications discussed based on the conclusion that the benefit outweighs the risks of this treatment regimen: continue quetiapine 25 mg at bedtime, continue Depakote  mg for now with plan to attempt to decrease and possibly eliminate as discussed, start sertraline 25 mg with breakfast every morning.     We are ordering the valproate level, CBC with differential and CMP and we shall review at the next appointment as coordinated with your brother Marcus  sister in law and the nurse, Tavia, today.    Follow up in 4 to 6 weeks.

## 2024-09-04 ENCOUNTER — APPOINTMENT (OUTPATIENT)
Dept: NEUROLOGY | Facility: CLINIC | Age: 79
End: 2024-09-04
Payer: MEDICARE

## 2024-09-04 ENCOUNTER — TELEPHONE (OUTPATIENT)
Dept: BEHAVIORAL HEALTH | Facility: CLINIC | Age: 79
End: 2024-09-04

## 2024-11-11 ENCOUNTER — APPOINTMENT (OUTPATIENT)
Dept: BEHAVIORAL HEALTH | Facility: CLINIC | Age: 79
End: 2024-11-11
Payer: MEDICARE

## 2024-11-11 DIAGNOSIS — F03.918 PSYCHOSIS IN ELDERLY WITH BEHAVIORAL DISTURBANCE (MULTI): ICD-10-CM

## 2024-11-11 DIAGNOSIS — R41.89 NEUROCOGNITIVE DEFICITS: ICD-10-CM

## 2024-11-11 DIAGNOSIS — R29.818 NEUROCOGNITIVE DEFICITS: ICD-10-CM

## 2024-11-11 DIAGNOSIS — F25.0 SCHIZOAFFECTIVE DISORDER, MIXED TYPE (MULTI): ICD-10-CM

## 2024-11-11 PROCEDURE — 1160F RVW MEDS BY RX/DR IN RCRD: CPT | Performed by: PSYCHIATRY & NEUROLOGY

## 2024-11-11 PROCEDURE — 99214 OFFICE O/P EST MOD 30 MIN: CPT | Performed by: PSYCHIATRY & NEUROLOGY

## 2024-11-11 PROCEDURE — 1157F ADVNC CARE PLAN IN RCRD: CPT | Performed by: PSYCHIATRY & NEUROLOGY

## 2024-11-11 PROCEDURE — 1159F MED LIST DOCD IN RCRD: CPT | Performed by: PSYCHIATRY & NEUROLOGY

## 2024-11-11 PROCEDURE — 1123F ACP DISCUSS/DSCN MKR DOCD: CPT | Performed by: PSYCHIATRY & NEUROLOGY

## 2024-11-11 NOTE — ASSESSMENT & PLAN NOTE
Diagnosis: schizoaffective disorder in substantial remission and obsessive compulsive disorder improved.     Treatment plan:  The FDA risks, benefits & alternatives to the medications prescribed were explained to you and your support person, your brother, Marcus Fontaine, who is your guardian with your sister in law, today. You & your support persons were able to understand & repeat these risks, benefits & alternatives to these prescribed medications. You and your support persons have agreed to proceed with treatment with the medications discussed based on the conclusion that the benefit outweighs the risks of this treatment regimen: continue quetiapine 25 mg at bedtime, continue Depakote  mg for now with plan to attempt to decrease and possibly eliminate as discussed, continue sertraline 25 mg with dinner daily.

## 2024-11-11 NOTE — PROGRESS NOTES
Subjective   Patient ID: Zachery Fontaine is a 79 y.o. male who presents for No chief complaint on file..    Virtual Consent: The patient engaged in a telehealth session via Epic audio visual or phone with this provider practicing within the [facility]. The identity of the patient was verified by their date of birth and last four digits of their social security number. The provider demonstrated that confidentially was preserved at their location. The patient was informed that they were responsible for ensuring confidentially was secured at their location. The patient's location was documented for emergency purposes. The patient was informed of the necessary steps that would occur if an emergency was to occur or technology failed during session.     An interactive audio and video telecommunication system which permits real time communications between the patient (at the originating site at his residence) and provider (at the distant site at home office) was utilized to provide this telehealth service.   Verbal consent was requested and obtained from Zachery Fontaine on this date, 11/11/24 for a telehealth visit.     HPI: The patient is seen with his brother, Marcus, and is doing well except for physical weakness.     Past Medical History:  04/05/2023: Abnormality of gait due to impairment of balance  04/05/2023: BPH (benign prostatic hyperplasia)  11/02/2023: Chronic constipation  04/09/2020: COVID-19      Comment:  COVID-19 virus infection  05/06/2020: COVID-19      Comment:  Pneumonia due to severe acute respiratory syndrome                coronavirus 2 (SARS-CoV-2)  04/05/2023: Debility  03/29/2016: Depressed  09/08/2016: Elevated prostate specific antigen (PSA)      Comment:  Abnormal PSA  04/05/2023: GERD (gastroesophageal reflux disease)  04/05/2023: Hyperlipidemia  04/05/2023: Multiple falls  04/05/2023: Neurocognitive deficits  04/05/2023: Obsessive-compulsive behavior  04/05/2023: Osteoarthritis,  generalized  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)      Comment:  Drug-induced parkinsonism  04/05/2023: Parkinsons disease (Multi)  04/05/2023: 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)  04/05/2023: Right bundle branch block (RBBB) on electrocardiography  04/05/2023: Schizoaffective disorder, mixed type (Multi)  No date: Unilateral inguinal hernia, without obstruction or gangrene,   not specified as recurrent      Comment:  Inguinal hernia  Active Problems  Problems    · Abnormality of gait due to impairment of balance (781.2) (R26.89)   · Alzheimer's dementia with behavioral disturbance (331.0,294.11) (G30.9,F02.818)   · Anxiety with depression (300.4) (F41.8)   · Bilateral impacted cerumen (380.4) (H61.23)   · BPH (benign prostatic hyperplasia) (600.00) (N40.0)   · Chronic constipation (564.00) (K59.09)   · Class 2 severe obesity with serious comorbidity and body mass index (BMI) of 37.0 to  37.9 in adult (278.01,V85.37) (E66.01,Z68.37)   · Colon cancer screening (V76.51) (Z12.11)   · COPD (chronic obstructive pulmonary disease) (496) (J44.9)   · Debility (799.3) (R53.81)   · Fatigue (780.79) (R53.83)   · Generalized weakness (780.79) (R53.1)   · GERD (gastroesophageal reflux disease) (530.81) (K21.9)   · Hyperlipidemia (272.4) (E78.5)   · Long term use of drug (V58.69) (Z79.899)   · Medicare annual wellness visit, subsequent (V70.0) (Z00.00)   · Multiple falls (V15.88) (R29.6)   · Neurocognitive deficits (781.99) (R29.818,R41.89)   · Obsessive-compulsive behavior (300.3) (R46.81)   · Parkinsons disease (332.0) (G20)   · DATscan positive 2019   · 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 (290.20) (F03.918)   · 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   · Schizoaffective disorder, mixed type (295.70) (F25.0)   · 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.52)   · Venous insufficiency (459.81) (I87.2)   · Vitamin D deficiency (268.9) (E55.9)     Past  Medical History  Problems    · History of Abnormal PSA (790.93) (R97.20)   · Resolved Date: 2018   · History of Bronchitis with bronchospasm (490) (J20.9)   · Resolved Date: 2015   · History of COVID-19 virus infection (079.89) (U07.1)   · Resolved Date: 15 Sep 2020   · 20   · History of Drug-induced parkinsonism (332.1,E980.5) (G21.19)   · Resolved Date: 06 Dec 2019   · History of Erectile dysfunction (607.84) (N52.9)   · History of hiatal hernia (V12.79) (Z87.19)   · History of nuclear stress test (V15.89) (Z92.89)   · History of paranoid schizophrenia (V11.0) (Z86.59)   · Resolved Date: 2019   · History of testicular mass (V13.89) (Z87.438)   ·  1-3-2012 benign mass   · History of Inguinal hernia (550.90) (K40.90)   · History of Pneumonia due to severe acute respiratory syndrome coronavirus 2  (SARS-CoV-2) (480.8,079.89) (U07.1,J12.82)     Surgical History  Problems    · History of Cardiovascular Stress Test   · Stress/ECHO 1-3-2013 WNL   · History of Inguinal Hernia Repair   · History of Mohs surgery     Family History  Mother    · Family history of    · Family history of congestive heart failure (V17.49) (Z82.49)  Father    · Family history of    · Family history of congestive heart failure (V17.49) (Z82.49)  Maternal Aunt    · Family history of Bipolar illness  Maternal Cousin    · Family history of Bipolar illness     Social History  Problems    · Living in assisted living (V60.6) (Z59.3)   · Never a smoker   · Parent   · Retired   ·   Mental Status Exam     General: In no acute distress.   Appearance: Mild parkinsonian tremor.   Attitude: Cooperative but limited.   Behavior: Significant cognitive, judgment and insight deficits.   Motor Activity: No agitation or retardation. No EPS/TD. Normal gait and station. Parkinsonian features which are mild.   Speech: Parkinsonian features which are mild.   Mood: Blunted.   Affect: Flat.   Thought Process: Significant  cognitive, judgment and insight deficits.   Thought Content: Impoverished.   Thought Perception: Does not endorse auditory or visual hallucinations, does not appear to be responding to hallucinatory stimuli.   Cognition: Significant cognitive, judgment and insight deficits.   Insight: Insight is limited.   Judgment: Judgment is limited.         Appearance: well-groomed.   Build: average.   Demeanor: average.   Eye Contact: average.   Motor Activity: average.   Speech: clear.   Language: Neurologic language is intact.   Fund of Knowledge: aware of current events, poor fund of knowledge.   Delusions: None Reported.   Self Harm: None Reported.   Aggressive: None Reported.   Mood:. Blunted.   Affect: blunted.   Orientation: alert, oriented x3.   Manner: cooperative.   Thought process: goal-directed.   Thought association: displays rational thought process.   Content of thought: Mr. EMEKA AYALA denies any suicidal or homicidal ideation or plans.   Abstract/ Rational Thought: mild impairment   Memory: grossly intact.   Behavior: calm.   Attention/Concentration: distractible.   Cognition: mild impairment.   Intelligence Estimate: below average.   Executive Function: mild impairment.   Insight: severe impairment.   Judgement: severe impairment.           Review of Systems   Neurological:         Mental Status Exam     General: In no acute distress.   Appearance: Mild parkinsonian tremor.   Attitude: Cooperative but limited.   Behavior: Significant cognitive, judgment and insight deficits.   Motor Activity: No agitation or retardation. No EPS/TD. Normal gait and station. Parkinsonian features which are mild.   Speech: Parkinsonian features which are mild.   Mood: Blunted.   Affect: Flat.   Thought Process: Significant cognitive, judgment and insight deficits.   Thought Content: Impoverished.   Thought Perception: Does not endorse auditory or visual hallucinations, does not appear to be responding to hallucinatory stimuli.    Cognition: Significant cognitive, judgment and insight deficits.   Insight: Insight is limited.   Judgment: Judgment is limited.         Appearance: well-groomed.   Build: average.   Demeanor: average.   Eye Contact: average.   Motor Activity: average.   Speech: clear.   Language: Neurologic language is intact.   Fund of Knowledge: aware of current events, poor fund of knowledge.   Delusions: None Reported.   Self Harm: None Reported.   Aggressive: None Reported.   Mood:. Blunted.   Affect: blunted.   Orientation: alert, oriented x3.   Manner: cooperative.   Thought process: goal-directed.   Thought association: displays rational thought process.   Content of thought: Mr. EMEKA AYALA denies any suicidal or homicidal ideation or plans.   Abstract/ Rational Thought: mild impairment   Memory: grossly intact.   Behavior: calm.   Attention/Concentration: distractible.   Cognition: mild impairment.   Intelligence Estimate: below average.   Executive Function: mild impairment.   Insight: severe impairment.   Judgement: severe impairment.      Psychiatric/Behavioral:          Mental Status Exam     General: In no acute distress.   Appearance: Mild parkinsonian tremor.   Attitude: Cooperative but limited.   Behavior: Significant cognitive, judgment and insight deficits.   Motor Activity: No agitation or retardation. No EPS/TD. Normal gait and station. Parkinsonian features which are mild.   Speech: Parkinsonian features which are mild.   Mood: Blunted.   Affect: Flat.   Thought Process: Significant cognitive, judgment and insight deficits.   Thought Content: Impoverished.   Thought Perception: Does not endorse auditory or visual hallucinations, does not appear to be responding to hallucinatory stimuli.   Cognition: Significant cognitive, judgment and insight deficits.   Insight: Insight is limited.   Judgment: Judgment is limited.         Appearance: well-groomed.   Build: average.   Demeanor: average.   Eye Contact:  average.   Motor Activity: average.   Speech: clear.   Language: Neurologic language is intact.   Fund of Knowledge: aware of current events, poor fund of knowledge.   Delusions: None Reported.   Self Harm: None Reported.   Aggressive: None Reported.   Mood:. Blunted.   Affect: blunted.   Orientation: alert, oriented x3.   Manner: cooperative.   Thought process: goal-directed.   Thought association: displays rational thought process.   Content of thought: Mr. EMEKA AYALA denies any suicidal or homicidal ideation or plans.   Abstract/ Rational Thought: mild impairment   Memory: grossly intact.   Behavior: calm.   Attention/Concentration: distractible.   Cognition: mild impairment.   Intelligence Estimate: below average.   Executive Function: mild impairment.   Insight: severe impairment.   Judgement: severe impairment.        Psych Review of Symptoms:    ADHD: Patient denied any symptoms.         Anxiety: Patient denied any symptoms.         Developmental and Sensory Concerns: Patient denied any symptoms.         Depressive Symptoms: Patient denied any symptoms.         Disruptive and Conduct Symptoms: Patient denied any symptoms.         Eating / Feeding Concerns: Patient denied any symptoms.         Elimination Symptoms: Patient denied any symptoms.         Manic Symptoms: Patient denied any symptoms.         Obsessive-Compulsive Symptoms: Patient denied any symptoms.         Psychotic Symptoms: Patient denied any symptoms.           Trauma Related Symptoms: Patient denied any symptoms.           Sleep Concerns: Patient denied any symptoms.             Objective   Physical Exam  Neurological:      Comments: Mental Status Exam     General: In no acute distress.   Appearance: Mild parkinsonian tremor.   Attitude: Cooperative but limited.   Behavior: Significant cognitive, judgment and insight deficits.   Motor Activity: No agitation or retardation. No EPS/TD. Normal gait and station. Parkinsonian features which are  mild.   Speech: Parkinsonian features which are mild.   Mood: Blunted.   Affect: Flat.   Thought Process: Significant cognitive, judgment and insight deficits.   Thought Content: Impoverished.   Thought Perception: Does not endorse auditory or visual hallucinations, does not appear to be responding to hallucinatory stimuli.   Cognition: Significant cognitive, judgment and insight deficits.   Insight: Insight is limited.   Judgment: Judgment is limited.         Appearance: well-groomed.   Build: average.   Demeanor: average.   Eye Contact: average.   Motor Activity: average.   Speech: clear.   Language: Neurologic language is intact.   Fund of Knowledge: aware of current events, poor fund of knowledge.   Delusions: None Reported.   Self Harm: None Reported.   Aggressive: None Reported.   Mood:. Blunted.   Affect: blunted.   Orientation: alert, oriented x3.   Manner: cooperative.   Thought process: goal-directed.   Thought association: displays rational thought process.   Content of thought: Mr. EMEKA AYALA denies any suicidal or homicidal ideation or plans.   Abstract/ Rational Thought: mild impairment   Memory: grossly intact.   Behavior: calm.   Attention/Concentration: distractible.   Cognition: mild impairment.   Intelligence Estimate: below average.   Executive Function: mild impairment.   Insight: severe impairment.   Judgement: severe impairment.      Psychiatric:      Comments: Mental Status Exam     General: In no acute distress.   Appearance: Mild parkinsonian tremor.   Attitude: Cooperative but limited.   Behavior: Significant cognitive, judgment and insight deficits.   Motor Activity: No agitation or retardation. No EPS/TD. Normal gait and station. Parkinsonian features which are mild.   Speech: Parkinsonian features which are mild.   Mood: Blunted.   Affect: Flat.   Thought Process: Significant cognitive, judgment and insight deficits.   Thought Content: Impoverished.   Thought Perception: Does not  endorse auditory or visual hallucinations, does not appear to be responding to hallucinatory stimuli.   Cognition: Significant cognitive, judgment and insight deficits.   Insight: Insight is limited.   Judgment: Judgment is limited.         Appearance: well-groomed.   Build: average.   Demeanor: average.   Eye Contact: average.   Motor Activity: average.   Speech: clear.   Language: Neurologic language is intact.   Fund of Knowledge: aware of current events, poor fund of knowledge.   Delusions: None Reported.   Self Harm: None Reported.   Aggressive: None Reported.   Mood:. Blunted.   Affect: blunted.   Orientation: alert, oriented x3.   Manner: cooperative.   Thought process: goal-directed.   Thought association: displays rational thought process.   Content of thought: Mr. EMEKA AYALA denies any suicidal or homicidal ideation or plans.   Abstract/ Rational Thought: mild impairment   Memory: grossly intact.   Behavior: calm.   Attention/Concentration: distractible.   Cognition: mild impairment.   Intelligence Estimate: below average.   Executive Function: mild impairment.   Insight: severe impairment.   Judgement: severe impairment.            Lab Review:   Orders Only on 07/25/2024   Component Date Value    [facility] HIE Valproic Acid 07/25/2024 51.5     [facility] Time Last Dose 07/25/2024 8PM     [facility] H[facility] Date Last Dose 07/25/2024 7/24/24    Office Visit on 07/24/2024   Component Date Value    WBC 07/24/2024 8.0     nRBC 07/24/2024 0.0     RBC 07/24/2024 4.47 (L)     Hemoglobin 07/24/2024 14.8     Hematocrit 07/24/2024 44.3     MCV 07/24/2024 99     MCH 07/24/2024 33.1     MCHC 07/24/2024 33.4     RDW 07/24/2024 13.8     Platelets 07/24/2024 210     Glucose 07/24/2024 104 (H)     Sodium 07/24/2024 138     Potassium 07/24/2024 5.0     Chloride 07/24/2024 103     Bicarbonate 07/24/2024 28     Anion Gap 07/24/2024 12     Urea Nitrogen 07/24/2024 24 (H)     Creatinine 07/24/2024 1.18     eGFR 07/24/2024 63      Calcium 07/24/2024 10.2     Albumin 07/24/2024 4.2     Alkaline Phosphatase 07/24/2024 57     Total Protein 07/24/2024 6.9     AST 07/24/2024 13     Bilirubin, Total 07/24/2024 0.8     ALT 07/24/2024 14     Cholesterol 07/24/2024 138     HDL-Cholesterol 07/24/2024 49.1     Cholesterol/HDL Ratio 07/24/2024 2.8     LDL Calculated 07/24/2024 67     VLDL 07/24/2024 22     Triglycerides 07/24/2024 111     Non HDL Cholesterol 07/24/2024 89     Prostate Specific AG 07/24/2024 2.65     Testosterone 07/24/2024 308     Thyroid Stimulating Horm* 07/24/2024 1.40     Vitamin D, 25-Hydroxy, T* 07/24/2024 56        Assessment/Plan   Psychiatric Risk Assessment  Violence Risk Assessment: none  Acute Risk of Harm to Others is Considered: low   Suicide Risk Assessment: age > 65 yrs old and   Protective Factors against Suicide: adherence to  treatment, fear of suicide, moral objections to suicide, positive family relationships, and sense of responsibility toward family  Acute Risk of Harm to Self is Considered: low    Imminent Risk of Suicide or Serious Self-Injury: Low   Chronic Risk of Suicide of Serious Self-Injury: Low  Risk factors: Age, depression history and   Protective factors: Denies current suicidal ideation, denies history of suicide attempts , willingness to seek help and support , gender, access to a variety of clinical interventions , and receiving and engaged in care for mental, physical, and substance use disorders      Imminent Risk of Violence or Homicide: Low   Risk Factors: No significant risk factors identified on screening  Protective Factors: Lack of known history of harm to others , Lack of known history of violent ideation , and lack of known access to firearms.     Assessment & Plan  Schizoaffective disorder, mixed type (Multi)  Diagnosis: schizoaffective disorder in substantial remission and obsessive compulsive disorder improved.     Treatment plan:  The FDA risks, benefits & alternatives to the  medications prescribed were explained to you and your support person, your brother, Marcus Fontaine, who is your guardian with your sister in law, today. You & your support persons were able to understand & repeat these risks, benefits & alternatives to these prescribed medications. You and your support persons have agreed to proceed with treatment with the medications discussed based on the conclusion that the benefit outweighs the risks of this treatment regimen: continue quetiapine 25 mg at bedtime, continue Depakote  mg for now with plan to attempt to decrease and possibly eliminate as discussed, continue sertraline 25 mg with dinner daily.       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     Treatment plan:  The FDA risks, benefits & alternatives to the medications prescribed were explained to you and your support person, your brother, Marcus Fontaine, who is your guardian with your sister in law, today. You & your support persons were able to understand & repeat these risks, benefits & alternatives to these prescribed medications. You and your support persons have agreed to proceed with treatment with the medications discussed based on the conclusion that the benefit outweighs the risks of this treatment regimen: continue quetiapine 25 mg at bedtime, continue Depakote  mg for now with plan to attempt to decrease and possibly eliminate as discussed, start sertraline 25 mg with dinner daily.       Neurocognitive deficits  The FDA risks, benefits & alternatives to the medications prescribed were explained to you and your support person, your brother, Marcus Fontaine, who is your guardian with your sister in law, today. You & your support persons were able to understand & repeat these risks, benefits & alternatives to these prescribed medications. You and your support persons have agreed to proceed  with treatment with the medications discussed based on the conclusion that the benefit outweighs the risks of this treatment regimen: continue quetiapine 25 mg at bedtime, continue Depakote  mg for now with plan to attempt to decrease and possibly eliminate as discussed, start sertraline 25 mg with dinner daily.  We shall continue to follow for possible future treatment with a cognitive enhancer.     Follow up in late April of 2025.  Time:   Prep time on date of the patient encounter: 5  minutes.   Time spent directly with patient/family/caregiver: 20 minutes.   Additional time spent on patient care activities: 0 minutes.   Documentation time: 5 minutes.   Total time on date of patient encounter: 30 minutes.

## 2024-11-11 NOTE — ASSESSMENT & PLAN NOTE
Diagnosis: schizoaffective disorder in substantial remission and obsessive compulsive disorder improved.     Treatment plan:  The FDA risks, benefits & alternatives to the medications prescribed were explained to you and your support person, your brother, Marcus Fontaine, who is your guardian with your sister in law, today. You & your support persons were able to understand & repeat these risks, benefits & alternatives to these prescribed medications. You and your support persons have agreed to proceed with treatment with the medications discussed based on the conclusion that the benefit outweighs the risks of this treatment regimen: continue quetiapine 25 mg at bedtime, continue Depakote  mg for now with plan to attempt to decrease and possibly eliminate as discussed, start sertraline 25 mg with dinner daily.

## 2025-03-07 ENCOUNTER — PATIENT OUTREACH (OUTPATIENT)
Dept: PRIMARY CARE | Facility: CLINIC | Age: 80
End: 2025-03-07
Payer: MEDICARE

## 2025-03-07 RX ORDER — LEVOFLOXACIN 500 MG/1
500 TABLET, FILM COATED ORAL DAILY
COMMUNITY
End: 2025-03-11

## 2025-03-07 NOTE — PROGRESS NOTES
Discharge Facility:[facility]  Discharge Diagnosis:DANG, Abdominal pain, SBO, General weakness, Lung nodule  Admission Date:3/3/25  Discharge Date: 3/6/25    PCP Appointment Date:Message sent to office  Specialist Appointment Date: TBD  Hospital Encounter and Summary Linked: Yes  See discharge assessment below for further details    Wrap Up  Wrap Up Additional Comments: Spoke with patient's son. He directed me to call [facility] [place] where patient lives. Spoke with his nurse Constance. She states he is doing better today. He has his discharge medication. She is requesting an order for PT/OT. Message sent to pcp office for order and follow up appointment. No questions or concerns at this time. (3/7/2025  4:07 PM)    Medications  Medications reviewed with patient/caregiver?: Yes (3/7/2025  4:07 PM)  Is the patient having any side effects they believe may be caused by any medication additions or changes?: No (3/7/2025  4:07 PM)  Does the patient have all medications ordered at discharge?: Yes (3/7/2025  4:07 PM)  Care Management Interventions: Provided patient education (3/7/2025  4:07 PM)  Prescription Comments: Levofloxacin, Lactobacillus acidophilus (3/7/2025  4:07 PM)  Is the patient taking all medications as directed (includes completed medication regime)?: Yes (3/7/2025  4:07 PM)  Care Management Interventions: Provided patient education (3/7/2025  4:07 PM)  Medication Comments: Patient has his discharge medication (3/7/2025  4:07 PM)    Appointments  Does the patient have a primary care provider?: Yes (3/7/2025  4:07 PM)  Care Management Interventions: Verified appointment date/time/provider (3/7/2025  4:07 PM)  Has the patient kept scheduled appointments due by today?: Yes (3/7/2025  4:07 PM)    Self Management  Has home health visited the patient within 72 hours of discharge?: Not applicable (3/7/2025  4:07 PM)  What Durable Medical Equipment (DME) was ordered?: N/A (3/7/2025  4:07 PM)    Patient  Teaching  Does the patient have access to their discharge instructions?: Yes (3/7/2025  4:07 PM)  Care Management Interventions: Reviewed instructions with patient (3/7/2025  4:07 PM)  What is the patient's perception of their health status since discharge?: Improving (3/7/2025  4:07 PM)  Is the patient/caregiver able to teach back the hierarchy of who to call/visit for symptoms/problems? PCP, Specialist, Home Health nurse, Urgent Care, ED, 911: Yes (3/7/2025  4:07 PM)

## 2025-03-20 ENCOUNTER — TELEPHONE (OUTPATIENT)
Dept: PRIMARY CARE | Facility: CLINIC | Age: 80
End: 2025-03-20
Payer: MEDICARE

## 2025-03-20 NOTE — TELEPHONE ENCOUNTER
Jean Paul a physical therapist from [facility] is calling to let you know that patient was discharged from home care today. If you have any questions Jean Paul can be reached at [phone].

## 2025-03-21 ENCOUNTER — PATIENT OUTREACH (OUTPATIENT)
Dept: PRIMARY CARE | Facility: CLINIC | Age: 80
End: 2025-03-21
Payer: MEDICARE

## 2025-03-31 RX ORDER — TORSEMIDE 10 MG/1
10 TABLET ORAL DAILY
COMMUNITY
Start: 2025-03-03 | End: 2025-04-15 | Stop reason: ALTCHOICE

## 2025-03-31 RX ORDER — OXYBUTYNIN CHLORIDE 10 MG/1
10 TABLET, EXTENDED RELEASE ORAL DAILY
COMMUNITY
Start: 2025-03-17 | End: 2025-04-15 | Stop reason: SINTOL

## 2025-04-14 ENCOUNTER — APPOINTMENT (OUTPATIENT)
Facility: CLINIC | Age: 80
End: 2025-04-14
Payer: MEDICARE

## 2025-04-14 VITALS
HEIGHT: 70 IN | WEIGHT: 205 LBS | DIASTOLIC BLOOD PRESSURE: 57 MMHG | TEMPERATURE: 97.2 F | SYSTOLIC BLOOD PRESSURE: 82 MMHG | BODY MASS INDEX: 29.35 KG/M2 | HEART RATE: 84 BPM

## 2025-04-14 DIAGNOSIS — I95.1 ORTHOSTASIS: ICD-10-CM

## 2025-04-14 DIAGNOSIS — R55 SYNCOPE, UNSPECIFIED SYNCOPE TYPE: Primary | ICD-10-CM

## 2025-04-14 PROCEDURE — 1123F ACP DISCUSS/DSCN MKR DOCD: CPT | Performed by: PSYCHIATRY & NEUROLOGY

## 2025-04-14 PROCEDURE — 1157F ADVNC CARE PLAN IN RCRD: CPT | Performed by: PSYCHIATRY & NEUROLOGY

## 2025-04-14 PROCEDURE — 1126F AMNT PAIN NOTED NONE PRSNT: CPT | Performed by: PSYCHIATRY & NEUROLOGY

## 2025-04-14 PROCEDURE — 1159F MED LIST DOCD IN RCRD: CPT | Performed by: PSYCHIATRY & NEUROLOGY

## 2025-04-14 PROCEDURE — 99215 OFFICE O/P EST HI 40 MIN: CPT | Performed by: PSYCHIATRY & NEUROLOGY

## 2025-04-14 PROCEDURE — 1160F RVW MEDS BY RX/DR IN RCRD: CPT | Performed by: PSYCHIATRY & NEUROLOGY

## 2025-04-14 PROCEDURE — 1036F TOBACCO NON-USER: CPT | Performed by: PSYCHIATRY & NEUROLOGY

## 2025-04-14 PROCEDURE — G2211 COMPLEX E/M VISIT ADD ON: HCPCS | Performed by: PSYCHIATRY & NEUROLOGY

## 2025-04-14 PROCEDURE — G8433 SCR FOR DEP NOT CPT DOC RSN: HCPCS | Performed by: PSYCHIATRY & NEUROLOGY

## 2025-04-14 RX ORDER — DOCUSATE SODIUM 100 MG/1
100 CAPSULE, LIQUID FILLED ORAL 2 TIMES DAILY
COMMUNITY

## 2025-04-14 RX ORDER — GUAIFENESIN/DEXTROMETHORPHAN 100-10MG/5
5 SYRUP ORAL 3 TIMES DAILY PRN
COMMUNITY

## 2025-04-14 RX ORDER — CRANBERRY FRUIT 450 MG
TABLET ORAL
COMMUNITY

## 2025-04-14 RX ORDER — BISACODYL 5 MG
5 TABLET, DELAYED RELEASE (ENTERIC COATED) ORAL DAILY PRN
COMMUNITY

## 2025-04-14 RX ORDER — IPRATROPIUM BROMIDE AND ALBUTEROL SULFATE 2.5; .5 MG/3ML; MG/3ML
3 SOLUTION RESPIRATORY (INHALATION)
COMMUNITY

## 2025-04-14 RX ORDER — FLUDROCORTISONE ACETATE 0.1 MG/1
0.1 TABLET ORAL DAILY
Qty: 30 TABLET | Refills: 11 | Status: SHIPPED | OUTPATIENT
Start: 2025-04-14 | End: 2025-04-15 | Stop reason: SDUPTHER

## 2025-04-14 ASSESSMENT — ENCOUNTER SYMPTOMS
DEPRESSION: 0
LOSS OF SENSATION IN FEET: 0
OCCASIONAL FEELINGS OF UNSTEADINESS: 1

## 2025-04-14 ASSESSMENT — PAIN SCALES - GENERAL: PAINLEVEL_OUTOF10: 0-NO PAIN

## 2025-04-14 NOTE — PATIENT INSTRUCTIONS
It was a pleasure seeing you today.     Start Florinef 0.1 take 1/2 tab daily x 2 weeks then 1 tab daily and continue.     I want you to get an autonomic testing.  Please schedule this for yourself by calling [phone].    This will help evaluate how well your nerves and muscles function.  I will call you if there is anything abnormal.    Hydrate well , 64 ounces daily. Wear compression socks.     Take blood pressure and heart rate laying down , wait 2 min then take this again then sit and wait 2 minutes and take this again then stand and do this again ( 3 separate Blood pressure / heart rate). Record this and whether you are dizzy.    Orthostatic Hypotension in  Parkinson’s Disease:  Essential Facts for Patients  What is orthostatic hypotension and  how common is it in Parkinson’s Disease?  Blood pressure (BP) is one of the most important vital signs. BP  has normal variations. For example, it is often a little higher  during day than at night. BP may also increase during stress.  When people stand up, their BP may drop slightly for a few  seconds. But it usually returns to normal quickly.  When BP doesn’t return to normal quickly after standing up, it is  referred to as orthostatic, or postural, hypotension (OH). This  form of low BP happens in about one third of patients with  Parkinson’s disease (PD). It is less common early in the disease,  but happens more often as the disease progresses.  BP readings have two numbers, for example 120/80 mmHg. The  top number is the systolic BP. That is the highest pressure when  the heart beats and pushes blood through the body. The bottom  number is the diastolic BP. That is the lowest pressure when the  heart relaxes between beats. OH is defined as a drop in the  systolic number of at least 20 mmHg or in the diastolic number  of at least 10 mmHg within 3 minutes after standing.  What are the symptoms of orthostatic  hypotension? Are they dangerous?  People with OH may  have a variety of symptoms when they  stand up, including:   Light-headedness   Dizziness   Weakness   Fatigue   Nausea   Blurred vision   Cognitive slowing   Legs buckling   Headache or neck pain radiating to the shoulders (so-called  coat- pain)  One of the dangers of OH is that it may cause falls. Sometimes  the drop in BP can be severe enough to cause fainting and loss of  consciousness (this is called syncope).  OH may be more common during the following times:   In the early morning   In hot weather   After a meal (particularly big meals)   After drinking alcohol   When urinating or having a bowel movement   During physical exercise  Do PD medications cause orthostatic  hypotension?  Some PD medications may cause this form of low BP or make it  worse. Those medications include levodopa and similar drugs. But  even people who don’t take PD medications may have OH. High  BP medicine and other drugs may also cause this form of low BP.  What can PD patients do to improve  orthostatic hypotension problems?  PD patients may try the following strategies to help relieve  problems with OH, possibly with their caregiver’s help.   Drink more fluids.   Drink 250-500 ml of water quickly over a period of 3-4  minutes. Do this upon waking up if symptoms occur when  getting out of bed or in the morning.   Minimize or avoid drinking alcohol.   Stand up slowly and stand still when feeling dizzy or  lightheaded.   Avoid standing still or laying in a flat position for long periods.   Avoid too much exposure to hot environments, such as hot  baths, saunas, etc.   Elevate the head of the bed when lying down -- try using a  wedge under the head of the bed.   Increase the amount of salt in the diet (if high BP is not a  problem).   Eat smaller, more frequent meals.   Wear elastic compression stockings or abdominal binders. It is  important that compression stockings go all the way up the leg  to the hip or over the  "abdomen.  Are there medications to treat  orthostatic hypotension in PD ?  The PD patient should review all medications with the doctor.  Certain medications may need to be stopped or cut back.  Several medications may be helpful in treating OH in PD patients.  These include fludrocortisone, midodrine, and droxidopa. These  drugs can be used alone or in combination. Doses can be adjusted  to help prevent BP from dropping to very low levels. Care is  needed to be sure the BP does not go too high when lying down.  What should PD patients do when they  have orthostatic hypotension symptoms?  The PD patient should sit or lie down immediately when having  OH symptoms. This should cause symptoms to disappear. Other  things the PD patient can do to overcome postural symptoms, are  shown below.     Please make a follow up appointment in 3-4 months.  You may also schedule a phone or virtual visit sooner on a Friday morning with me as needed before the next clinic appointment.     For any urgent issues or needing to speak to a medical assistant please call [phone], option 6 during our office hours Monday-Friday 8am-4pm, and leave a voicemail with your concern.  My office will try to reach back you as soon as possible within 24 (business) hours.  If you have an emergency please call 911 or visit a local urgent care or nearest emergency room.      Please understand that [employer] is a useful communication tool for simple \"normal\" results or a refill request but I would not recommend using this tool for emergent or urgent issues or for conversations with me.  I am happy to ask my staff to rearrange a follow up visit or a virtual visit sooner than requested if appropriate for your care.    "

## 2025-04-14 NOTE — PROGRESS NOTES
Subjective      Zachery Fontaine is a 79 y.o. year old male is here for follow up for parkinsonism, new syncopal episodes.    Arrives with brother and sister in law.     Parkinson's Disease    Last visit was one year ago, was lost to follow up.  He was seen in the ER and hospitalized twice .  Had SBO and passing out.  He has episodes of starring/ unresponsiveness.  BP is 80/54.  Was at [facility].   [facility] head was done , was unremarkable.   Dr. Drake follows for schizophrenia. Showed   He has had almost near falls.  Has freezing episodes.   Symptoms seem improved in the afternoon.  Sleep interruptions due to urinary frequency.  Saw urology today and flomax is being increased.  He does have lightheadedness even when sitting.   Memory is not good.  He has gone to chair exercises sometimes.  BP has been variable, mostly low.     Current PD Meds:   Sinemet 25-100mg 1.5 tabs three times daily. ( 7am/ 12pm/5pm)    He is still on Depakote/ Quetiapine for schizophrenia.      since 2016 he began having walking issues.      Abilify - was on this from Dec -July 2019. prior to that- Olanzapine was on for few years - stopped in Dec- was on for 2 years. Haldol was given at one point, early [id number].   tremors since 2015 of both arms. tremors are getting worse.   memory is okay.      FH: maternal grandmother had tremors.   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)    Right bundle branch block (RBBB) on  electrocardiography    Schizoaffective disorder, mixed type (Multi)    Uncontrolled REM sleep behavior disorder    Venous insufficiency    Vitamin D deficiency    Chronic constipation     Past Medical History:   Diagnosis Date    Abnormality of gait due to impairment of balance 2023    BPH (benign prostatic hyperplasia) 2023    Chronic constipation 2023    COVID-19 2020    COVID-19 virus infection    COVID-19 2020    Pneumonia due to severe acute respiratory syndrome coronavirus 2 (SARS-CoV-2)    Debility 2023    Depressed 2016    Elevated prostate specific antigen (PSA) 2016    Abnormal PSA    GERD (gastroesophageal reflux disease) 2023    Hyperlipidemia 2023    Multiple falls 2023    Neurocognitive deficits 2023    Obsessive-compulsive behavior 2023    Osteoarthritis, generalized 2023    Other drug induced secondary parkinsonism 2019    Drug-induced parkinsonism    Parkinsons disease (Multi) 2023    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    Right bundle branch block (RBBB) on electrocardiography 2023    Schizoaffective disorder, mixed type (Multi) 2023    Unilateral inguinal hernia, without obstruction or gangrene, not specified as recurrent     Inguinal hernia     Past Surgical History:   Procedure Laterality Date    HERNIA REPAIR  2014    Inguinal Hernia Repair    OTHER SURGICAL HISTORY  2014    Cardiovascular Stress Test    OTHER SURGICAL HISTORY  2019    Mohs surgery     Social History     Tobacco Use    Smoking status: Former     Current packs/day: 0.00     Types: Cigarettes     Quit date: 1960     Years since quittin.3    Smokeless tobacco: Never   Substance Use Topics    Alcohol use: Not Currently     Comment: rarely     family history includes Bipolar disorder in his mother's sister; Heart failure in his father and mother.    Current Outpatient  Medications:     acetaminophen (Tylenol) 325 mg tablet, Take 1 tablet (325 mg) by mouth. Give 2 tablets by mouth every 6 hrs as needed for pain; elevated temp., Disp: , Rfl:     ascorbic acid (Vitamin C) 500 mg chewable tablet, Chew 1 tablet (500 mg) 2 times a day., Disp: 180 tablet, Rfl: 3    aspirin 81 mg EC tablet, Take 1 tablet (81 mg) by mouth once daily. Give 1 tablet orally one time a day, Disp: 90 tablet, Rfl: 3    bisacodyl (Dulcolax) 5 mg EC tablet, Take 1 tablet (5 mg) by mouth once daily as needed for constipation. Do not crush, chew, or split., Disp: , Rfl:     carbidopa-levodopa (Sinemet)  mg tablet, Take by mouth 3 times a day. 1.5 tablet by mouth three times a day, Disp: , Rfl:     cranberry fruit (cranberry) 450 mg tablet, Take by mouth., Disp: , Rfl:     cyanocobalamin (Vitamin B-12) 1,000 mcg tablet, Take 1 tablet (1,000 mcg) by mouth once daily., Disp: 90 tablet, Rfl: 3    dextromethorphan-guaifenesin (Robitussin DM)  mg/5 mL oral liquid, Take 5 mL by mouth 3 times a day as needed for cough., Disp: , Rfl:     divalproex (Depakote) 500 mg EC tablet, Take 1 tablet (500 mg) by mouth once daily at bedtime., Disp: 90 tablet, Rfl: 1    docusate sodium (Colace) 100 mg capsule, Take 1 capsule (100 mg) by mouth 2 times a day., Disp: , Rfl:     ergocalciferol (Vitamin D-2) 1.25 MG (35365 UT) capsule, Take 1 capsule (1,250 mcg) by mouth 1 (one) time per week., Disp: 12 capsule, Rfl: 3    finasteride (Proscar) 5 mg tablet, Take 1 tablet (5 mg) by mouth once daily., Disp: 90 tablet, Rfl: 3    ipratropium-albuteroL (Duo-Neb) 0.5-2.5 mg/3 mL nebulizer solution, Take 3 mL by nebulization every 6 hours., Disp: , Rfl:     melatonin 3 mg capsule, Take 3 mg by mouth once daily., Disp: 90 capsule, Rfl: 3    meloxicam (Mobic) 7.5 mg tablet, Take 1 tablet (7.5 mg) by mouth once daily., Disp: 90 tablet, Rfl: 3    multivitamin with minerals iron-free (Centrum Silver), Take 1 tablet by mouth once daily., Disp:  "90 tablet, Rfl: 3    nystatin (Mycostatin) 100,000 unit/gram powder, Apply twice daily to intertriginous/skin fold rash until resolved, Disp: 60 g, Rfl: 11    oxybutynin XL (Ditropan-XL) 10 mg 24 hr tablet, Take 1 tablet (10 mg) by mouth once daily., Disp: , Rfl:     pantoprazole (ProtoNix) 40 mg EC tablet, Take 1 tablet (40 mg) by mouth once daily., Disp: 90 tablet, Rfl: 3    polyethylene glycol (Glycolax, Miralax) 17 gram/dose powder, Take 17 g by mouth once daily. Give 17 gram by mouth one time a day every other day for constipation, Disp: 1700 g, Rfl: 3    pravastatin (Pravachol) 40 mg tablet, Take 1 tablet (40 mg) by mouth once daily., Disp: 90 tablet, Rfl: 3    QUEtiapine (SEROquel) 25 mg tablet, Take 1 tablet (25 mg) by mouth once daily at bedtime., Disp: 90 tablet, Rfl: 1    sennosides-docusate sodium (Senokot-S) 8.6-50 mg tablet, Take 1 tablet by mouth 2 times a day., Disp: 180 tablet, Rfl: 3    sertraline (Zoloft) 25 mg tablet, One tablet with breakfast daily., Disp: 30 tablet, Rfl: 1    tamsulosin (Flomax) 0.4 mg 24 hr capsule, Take 1 capsule (0.4 mg) by mouth 2 times a day., Disp: 180 capsule, Rfl: 3    Lactobacillus acidophilus capsule, Take 1 capsule by mouth once daily. (Patient not taking: Reported on 4/14/2025), Disp: , Rfl:     torsemide (Demadex) 10 mg tablet, 1 tablet (10 mg) once daily. (Patient not taking: Reported on 4/14/2025), Disp: , Rfl:     zinc gluconate 50 mg tablet, Take by mouth. (Patient not taking: Reported on 4/14/2025), Disp: , Rfl:   No Known Allergies  BP 82/57 (BP Location: Right arm, Patient Position: Sitting)   Pulse 84   Temp 36.2 °C (97.2 °F)   Ht 1.778 m (5' 10\")   Wt 93 kg (205 lb)   BMI 29.41 kg/m²   Neurological Exam/Physical Exam:    Constitutional: General appearance: no acute distress. Pleasant. Obese. Hypomimia. In wheelchair.   Auscultation of Heart: Regular rate and rhythm, no murmurs, normal S1 and S2.   Carotid Arteries: no bruits  Peripheral Vascular Exam: " No swelling, edema or tenderness to palpation in extremities  Mental status: no distress, alert, interactive and cooperative. Affect is appropriate.     Cranial nerve II: Visual fields full to confrontation.   Cranial nerves III, IV, and VI: Pupils round, equally reactive to light; EOMs intact. No nystagmus.   Cranial Nerve V: Facial sensation intact to LT bilaterally.   Cranial nerve VII: no facial droop  Cranial nerve VIII: Hearing is intact  Cranial nerves IX and X: Palate elevates symmetrically.   Cranial nerve XI: Shoulder shrug intact.   Cranial nerve XII: Tongue is midline.  Motor:    resting tremor in both hands, moderate - severe bradykinesia in all ext. rigidity worse on L >R. mild myoclonic jerking of UE randomly when doing things.   Deep Tendon Reflexes: left biceps 2+ , right biceps 2+, left triceps 2+, right triceps 2+, left brachioradialis 2+, right brachioradialis 2+, left patella 2+, right patella 2+, left ankle jerk 2+, right ankle jerk 2+   Plantar Reflex: Toes downgoing to plantar stimulation on the left. Toes downgoing to plantar stimulation on the right.   Sensory Exam: Normal to vibratory sensation  Coordination:  no limb dystaxia  Gait: in wheelchair.        Labs:  CBC:   Lab Results   Component Value Date    WBC 8.0 07/24/2024    HGB 14.8 07/24/2024    HCT 44.3 07/24/2024     07/24/2024     BMP:   Lab Results   Component Value Date     07/24/2024    K 5.0 07/24/2024     07/24/2024    CO2 28 07/24/2024    BUN 24 (H) 07/24/2024    CREATININE 1.18 07/24/2024    CALCIUM 10.2 07/24/2024     LFT:   Lab Results   Component Value Date    ALKPHOS 57 07/24/2024    BILITOT 0.8 07/24/2024    PROT 6.9 07/24/2024    ALBUMIN 4.2 07/24/2024    ALT 14 07/24/2024    AST 13 07/24/2024         Assessment/Plan   Problem List Items Addressed This Visit    None      This is a chronic neurologic condition that requires ongoing care and monitoring. This is a complex, serious condition that needs  long term care going forward. Between myself and the patient we will be changing direction of care depending on responses to treatment.   Today we discussed medication options, non medication options for management and various other symptoms that are in relation to this disease.  I will continue to be involved in the care of this patient.     Follows with psychiatry for depression, medications being adjusted.     PT for balance.  U STEP walker.   Continue Sinemet as is, will not increase due to lightheadedness/ low BP at times. Encouraged hydration.

## 2025-04-15 ENCOUNTER — APPOINTMENT (OUTPATIENT)
Dept: PRIMARY CARE | Facility: CLINIC | Age: 80
End: 2025-04-15
Payer: MEDICARE

## 2025-04-15 VITALS
BODY MASS INDEX: 29.13 KG/M2 | SYSTOLIC BLOOD PRESSURE: 95 MMHG | HEART RATE: 76 BPM | WEIGHT: 203 LBS | DIASTOLIC BLOOD PRESSURE: 53 MMHG | OXYGEN SATURATION: 95 %

## 2025-04-15 DIAGNOSIS — K59.04 CHRONIC IDIOPATHIC CONSTIPATION: ICD-10-CM

## 2025-04-15 DIAGNOSIS — R46.81 OBSESSIVE-COMPULSIVE BEHAVIOR: ICD-10-CM

## 2025-04-15 DIAGNOSIS — F25.0 SCHIZOAFFECTIVE DISORDER, MIXED TYPE (MULTI): ICD-10-CM

## 2025-04-15 DIAGNOSIS — R35.0 BENIGN PROSTATIC HYPERPLASIA WITH URINARY FREQUENCY: ICD-10-CM

## 2025-04-15 DIAGNOSIS — F02.818 ALZHEIMER'S DEMENTIA WITH BEHAVIORAL DISTURBANCE (MULTI): ICD-10-CM

## 2025-04-15 DIAGNOSIS — R55 SYNCOPE, UNSPECIFIED SYNCOPE TYPE: Primary | ICD-10-CM

## 2025-04-15 DIAGNOSIS — G20.B1 PARKINSON'S DISEASE WITH DYSKINESIA WITHOUT FLUCTUATING MANIFESTATIONS: ICD-10-CM

## 2025-04-15 DIAGNOSIS — G90.9 DYSFUNCTIONAL AUTONOMIC NERVOUS SYSTEM: ICD-10-CM

## 2025-04-15 DIAGNOSIS — N40.1 BENIGN PROSTATIC HYPERPLASIA WITH URINARY FREQUENCY: ICD-10-CM

## 2025-04-15 DIAGNOSIS — F03.918 PSYCHOSIS IN ELDERLY WITH BEHAVIORAL DISTURBANCE (MULTI): ICD-10-CM

## 2025-04-15 DIAGNOSIS — I95.1 ORTHOSTATIC HYPOTENSION: ICD-10-CM

## 2025-04-15 DIAGNOSIS — G30.9 ALZHEIMER'S DEMENTIA WITH BEHAVIORAL DISTURBANCE (MULTI): ICD-10-CM

## 2025-04-15 PROCEDURE — 1157F ADVNC CARE PLAN IN RCRD: CPT | Performed by: FAMILY MEDICINE

## 2025-04-15 PROCEDURE — 1160F RVW MEDS BY RX/DR IN RCRD: CPT | Performed by: FAMILY MEDICINE

## 2025-04-15 PROCEDURE — 1036F TOBACCO NON-USER: CPT | Performed by: FAMILY MEDICINE

## 2025-04-15 PROCEDURE — 1159F MED LIST DOCD IN RCRD: CPT | Performed by: FAMILY MEDICINE

## 2025-04-15 PROCEDURE — 1123F ACP DISCUSS/DSCN MKR DOCD: CPT | Performed by: FAMILY MEDICINE

## 2025-04-15 PROCEDURE — 99495 TRANSJ CARE MGMT MOD F2F 14D: CPT | Performed by: FAMILY MEDICINE

## 2025-04-15 RX ORDER — FLUDROCORTISONE ACETATE 0.1 MG/1
0.1 TABLET ORAL DAILY
Start: 2025-04-15 | End: 2026-04-15

## 2025-04-15 RX ORDER — POLYETHYLENE GLYCOL 3350 17 G/17G
17 POWDER, FOR SOLUTION ORAL DAILY
Qty: 1700 G | Refills: 3 | Status: SHIPPED | OUTPATIENT
Start: 2025-04-15

## 2025-04-15 NOTE — PATIENT INSTRUCTIONS
Stop Flomax (tamsulosin) due to hypotension.  Start Florinef 0.1 mg once daily.  Start with 1/2 pill daily x 2 wks then 1 tab daily for Orthostatic Hypotension    Stop Ditropan XL (oxybutynin) due to potential constipation, dry mouth, confusion    Change Miralax to daily;  continue Senakot-S BID.  Keep Dulcolax 10mg po BID prn no BM x 48 hrs.    Fluid requirement - 100 oz daily.    Vital signs daily:  BP and pulse.  Notify Dr. Bender if 3 or more BP < 110/60.    Check with DR. Drake about dosing of Seroquel.

## 2025-04-15 NOTE — PROGRESS NOTES
Subjective     Patient ID: [id number]     Zachery Fontaine is a 79 y.o. male who presents for Hospital Follow-up.  Brother and sister-in-law are present for today's appointment.    HPI  Hospitalized at [facility] on 3/3/25 with SBO.  Resolved spontaneously.  Chronic constipation.  Poor p.o. fluid intake, sedentary lifestyle, oxybutynin.  Assisted living orders include Dulcolax tablet if no bowel movement after 48 hours but patient states that there are times when he does not have a bowel movement for up to 7 days.    Hospitalized at [facility] in March 21, 2025 due to syncope and orthostatic hypotension.  Tx was to increase hydration, compression stocking   Office visit with Dr. Daniel, Neurologist.  Added Florinef.  Since returning to [facility], has had several more near syncope but no falls.    Patient states he has a poor appetite.  Feels that he does not eat much food.  He believes that is why he has constipation.      Objective   BP 95/53   Pulse 76   Wt 92.1 kg (203 lb)   SpO2 95%   BMI 29.13 kg/m²    Physical Exam:   Alert and oriented x 3.  No acute distress.  Wheelchair throughout exam.  No obvious resting tremors.  Detailed review of medications from Dr. Daniel's office visit compared to [facility] medication list dated April 14, 2025.    Reviewed hospital records from [facility]    Assessment/Plan   1. Syncope, unspecified syncope type (Primary)  Likely etiology is multifactorial: Parkinson's disease with autonomic dysfunction, medications that may lower his blood pressure.    2. Orthostatic hypotension  Continue Florinef as prescribed by Dr. Daniel  Continue GAY hose on in the morning, off at at bedtime.  Increase p.o. fluid intake.  Ideally 100 ounces per day  Ordered daily blood pressure and pulse readings.  Nursing to notify me if blood pressures are less than 110/60    3. Chronic idiopathic constipation  -Increase fluids as noted above.   Continue bowel regimen medications as indicated above.  Will change MiraLAX to daily  - polyethylene glycol (Glycolax, Miralax) 17 gram/dose powder; Mix 17 g of powder and drink once daily. Dx:  constipation  Dispense: 1700 g; Refill: 3    4. 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 Daniel    5. Benign prostatic hyperplasia with urinary frequency  -Continue Proscar but discontinue Flomax as Flomax may cause lower blood pressures.  - Trial discontinuing Ditropan XL due to its potential adverse effect of constipation and confusion    7. Alzheimer's dementia with behavioral disturbance (Multi)  Continue management by psychiatrist, Dr. Drake    8. Schizoaffective disorder, mixed type (Multi)  Continue management by psychiatrist, Dr. Drake    9. Obsessive-compulsive behavior  Continue management by psychiatrist, Dr. Drake    10. 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 Drake      Follow up in August as scheduled for medical management    I will continue to monitor, evaluate, assess and treat all problems/diagnoses as appropriate and continue to collaborate with specialists.    Contact office or send a  MY Chart message with any questions or concerns    Encouraged to sign up with my  My Chart  Patient will only be notified of labs that require medical intervention.    Prescriptions will not be filled unless you are compliant with your follow up appointments or have a follow up appointment scheduled as per instruction of your physician. Refills should be requested at the time of your visit.    **Charting was completed using voice recognition technology and may include unintended errors**    Marcus Bender DO, [facility]  [id number]  [place] [address] [phone]

## 2025-04-16 ENCOUNTER — TELEPHONE (OUTPATIENT)
Dept: PRIMARY CARE | Facility: CLINIC | Age: 80
End: 2025-04-16

## 2025-04-16 NOTE — TELEPHONE ENCOUNTER
Pt's senior living facility called and wanted to let you know that they already are giving his Miralax daily.  Supposedly there was notation that you wanted the facility to start giving it daily vs every other day.  So is there another order that needs to be written?      But to reiterate the facility is already giving Miralax daily.    [phone]

## 2025-04-24 ENCOUNTER — TELEPHONE (OUTPATIENT)
Dept: PRIMARY CARE | Facility: CLINIC | Age: 80
End: 2025-04-24
Payer: MEDICARE

## 2025-04-24 NOTE — TELEPHONE ENCOUNTER
Augusta called from [facility] asking for med orders for polyethylene glycol (Glycolax, Miralax)  and fludrocortisone (Florinef)  she needs the orders to be faxed to [phone]

## 2025-04-28 ENCOUNTER — APPOINTMENT (OUTPATIENT)
Dept: BEHAVIORAL HEALTH | Facility: CLINIC | Age: 80
End: 2025-04-28
Payer: MEDICARE

## 2025-04-28 DIAGNOSIS — F25.0 SCHIZOAFFECTIVE DISORDER, MIXED TYPE (MULTI): ICD-10-CM

## 2025-04-28 DIAGNOSIS — R29.818 NEUROCOGNITIVE DEFICITS: ICD-10-CM

## 2025-04-28 DIAGNOSIS — R41.89 NEUROCOGNITIVE DEFICITS: ICD-10-CM

## 2025-04-28 DIAGNOSIS — F03.918 PSYCHOSIS IN ELDERLY WITH BEHAVIORAL DISTURBANCE (MULTI): ICD-10-CM

## 2025-04-28 PROCEDURE — 1123F ACP DISCUSS/DSCN MKR DOCD: CPT | Performed by: PSYCHIATRY & NEUROLOGY

## 2025-04-28 PROCEDURE — 99215 OFFICE O/P EST HI 40 MIN: CPT | Performed by: PSYCHIATRY & NEUROLOGY

## 2025-04-28 PROCEDURE — 1157F ADVNC CARE PLAN IN RCRD: CPT | Performed by: PSYCHIATRY & NEUROLOGY

## 2025-04-28 NOTE — PROGRESS NOTES
Subjective   Patient ID: Zachery Fontaine is a 79 y.o. male who presents for No chief complaint on file. I am more anxious now.    Virtual Consent: The patient engaged in a telehealth session via Epic audio visual or phone with this provider practicing within the [facility]. The identity of the patient was verified by their date of birth and last four digits of their social security number. The provider demonstrated that confidentially was preserved at their location. The patient was informed that they were responsible for ensuring confidentially was secured at their location. The patient's location was documented for emergency purposes. The patient was informed of the necessary steps that would occur if an emergency was to occur or technology failed during session.    An interactive audio and video telecommunication system which permits real time communications between the patient (at the originating site at his residence) and provider (at the distant site at home office) was utilized to provide this telehealth service.   Verbal consent was requested and obtained from Zachery Fontaine on this date, 4/28/25 for a telehealth visit.      HPI: The patient is seen with his brother, Marcus, his sister in law throughout the appointment today. The patient had recent unresponsive episodes and neurology is recommending autonomic testing. The patient has been more anxious.      Past Medical History:  04/05/2023: Abnormality of gait due to impairment of balance  04/05/2023: BPH (benign prostatic hyperplasia)  11/02/2023: Chronic constipation  04/09/2020: COVID-19      Comment:  COVID-19 virus infection  05/06/2020: COVID-19      Comment:  Pneumonia due to severe acute respiratory syndrome                coronavirus 2 (SARS-CoV-2)  04/05/2023: Debility  03/29/2016: Depressed  09/08/2016: Elevated prostate specific antigen (PSA)      Comment:  Abnormal PSA  04/05/2023: GERD (gastroesophageal reflux disease)  04/05/2023:  Hyperlipidemia  04/05/2023: Multiple falls  04/05/2023: Neurocognitive deficits  04/05/2023: Obsessive-compulsive behavior  04/05/2023: Osteoarthritis, generalized  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)      Comment:  Drug-induced parkinsonism  04/05/2023: Parkinsons disease (Multi)  04/05/2023: 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)  04/05/2023: Right bundle branch block (RBBB) on electrocardiography  04/05/2023: Schizoaffective disorder, mixed type (Multi)  No date: Unilateral inguinal hernia, without obstruction or gangrene,   not specified as recurrent      Comment:  Inguinal hernia  Active Problems  Problems    · Abnormality of gait due to impairment of balance (781.2) (R26.89)   · Alzheimer's dementia with behavioral disturbance (331.0,294.11) (G30.9,F02.818)   · Anxiety with depression (300.4) (F41.8)   · Bilateral impacted cerumen (380.4) (H61.23)   · BPH (benign prostatic hyperplasia) (600.00) (N40.0)   · Chronic constipation (564.00) (K59.09)   · Class 2 severe obesity with serious comorbidity and body mass index (BMI) of 37.0 to  37.9 in adult (278.01,V85.37) (E66.01,Z68.37)   · Colon cancer screening (V76.51) (Z12.11)   · COPD (chronic obstructive pulmonary disease) (496) (J44.9)   · Debility (799.3) (R53.81)   · Fatigue (780.79) (R53.83)   · Generalized weakness (780.79) (R53.1)   · GERD (gastroesophageal reflux disease) (530.81) (K21.9)   · Hyperlipidemia (272.4) (E78.5)   · Long term use of drug (V58.69) (Z79.899)   · Medicare annual wellness visit, subsequent (V70.0) (Z00.00)   · Multiple falls (V15.88) (R29.6)   · Neurocognitive deficits (781.99) (R29.818,R41.89)   · Obsessive-compulsive behavior (300.3) (R46.81)   · Parkinsons disease (332.0) (G20)   · DATscan positive 2019   · 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 (290.20) (F03.918)   · 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   · Schizoaffective disorder, mixed type (295.70) (F25.0)   ·  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.52)   · Venous insufficiency (459.81) (I87.2)   · Vitamin D deficiency (268.9) (E55.9)     Past Medical History  Problems    · History of Abnormal PSA (790.93) (R97.20)   · Resolved Date: 2018   · History of Bronchitis with bronchospasm (490) (J20.9)   · Resolved Date: 2015   · History of COVID-19 virus infection (079.89) (U07.1)   · Resolved Date: 15 Sep 2020   · 20   · History of Drug-induced parkinsonism (332.1,E980.5) (G21.19)   · Resolved Date: 06 Dec 2019   · History of Erectile dysfunction (607.84) (N52.9)   · History of hiatal hernia (V12.79) (Z87.19)   · History of nuclear stress test (V15.89) (Z92.89)   · History of paranoid schizophrenia (V11.0) (Z86.59)   · Resolved Date: 2019   · History of testicular mass (V13.89) (Z87.438)   · [place] 1-3-2012 benign mass   · History of Inguinal hernia (550.90) (K40.90)   · History of Pneumonia due to severe acute respiratory syndrome coronavirus 2  (SARS-CoV-2) (480.8,079.89) (U07.1,J12.82)     Surgical History  Problems    · History of Cardiovascular Stress Test   · Stress/ECHO 1-3-2013 WNL   · History of Inguinal Hernia Repair   · History of Mohs surgery     Family History  Mother    · Family history of    · Family history of congestive heart failure (V17.49) (Z82.49)  Father    · Family history of    · Family history of congestive heart failure (V17.49) (Z82.49)  Maternal Aunt    · Family history of Bipolar illness  Maternal Cousin    · Family history of Bipolar illness     Social History  Problems    · Living in assisted living (V60.6) (Z59.3)   · Never a smoker   · Parent   · Retired   ·       Mental Status Exam     General: In no acute distress.   Appearance: Mild parkinsonian tremor.   Attitude: Cooperative but limited.   Behavior: Significant cognitive, judgment and insight deficits.   Motor Activity: No agitation or retardation. No EPS/TD. Normal gait and  station. Parkinsonian features which are mild.   Speech: Parkinsonian features which are mild.   Mood: Blunted.   Affect: Flat.   Thought Process: Significant cognitive, judgment and insight deficits.   Thought Content: Impoverished.   Thought Perception: Does not endorse auditory or visual hallucinations, does not appear to be responding to hallucinatory stimuli.   Cognition: Significant cognitive, judgment and insight deficits.   Insight: Insight is limited.   Judgment: Judgment is limited.         Appearance: well-groomed.   Build: average.   Demeanor: average.   Eye Contact: average.   Motor Activity: average.   Speech: clear.   Language: Neurologic language is intact.   Fund of Knowledge: aware of current events, poor fund of knowledge.   Delusions: None Reported.   Self Harm: None Reported.   Aggressive: None Reported.   Mood:. Blunted.   Affect: blunted.   Orientation: alert, oriented x3.   Manner: cooperative.   Thought process: goal-directed.   Thought association: displays rational thought process.   Content of thought: Mr. EMEKA AYALA denies any suicidal or homicidal ideation or plans.   Abstract/ Rational Thought: mild impairment   Memory: grossly intact.   Behavior: calm.   Attention/Concentration: distractible.   Cognition: mild impairment.   Intelligence Estimate: below average.   Executive Function: mild impairment.   Insight: severe impairment.   Judgement: severe impairment.         Review of Systems   Neurological:         Mental Status Exam     General: In no acute distress.   Appearance: Mild parkinsonian tremor.   Attitude: Cooperative but limited.   Behavior: Significant cognitive, judgment and insight deficits.   Motor Activity: No agitation or retardation. No EPS/TD. Normal gait and station. Parkinsonian features which are mild.   Speech: Parkinsonian features which are mild.   Mood: Blunted.   Affect: Flat.   Thought Process: Significant cognitive, judgment and insight deficits.   Thought  Content: Impoverished.   Thought Perception: Does not endorse auditory or visual hallucinations, does not appear to be responding to hallucinatory stimuli.   Cognition: Significant cognitive, judgment and insight deficits.   Insight: Insight is limited.   Judgment: Judgment is limited.         Appearance: well-groomed.   Build: average.   Demeanor: average.   Eye Contact: average.   Motor Activity: average.   Speech: clear.   Language: Neurologic language is intact.   Fund of Knowledge: aware of current events, poor fund of knowledge.   Delusions: None Reported.   Self Harm: None Reported.   Aggressive: None Reported.   Mood:. Blunted.   Affect: blunted.   Orientation: alert, oriented x3.   Manner: cooperative.   Thought process: goal-directed.   Thought association: displays rational thought process.   Content of thought: Mr. EMEKA AYALA denies any suicidal or homicidal ideation or plans.   Abstract/ Rational Thought: mild impairment   Memory: grossly intact.   Behavior: calm.   Attention/Concentration: distractible.   Cognition: mild impairment.   Intelligence Estimate: below average.   Executive Function: mild impairment.   Insight: severe impairment.   Judgement: severe impairment.      Psychiatric/Behavioral:          Mental Status Exam     General: In no acute distress.   Appearance: Mild parkinsonian tremor.   Attitude: Cooperative but limited.   Behavior: Significant cognitive, judgment and insight deficits.   Motor Activity: No agitation or retardation. No EPS/TD. Normal gait and station. Parkinsonian features which are mild.   Speech: Parkinsonian features which are mild.   Mood: Blunted.   Affect: Flat.   Thought Process: Significant cognitive, judgment and insight deficits.   Thought Content: Impoverished.   Thought Perception: Does not endorse auditory or visual hallucinations, does not appear to be responding to hallucinatory stimuli.   Cognition: Significant cognitive, judgment and insight deficits.    Insight: Insight is limited.   Judgment: Judgment is limited.         Appearance: well-groomed.   Build: average.   Demeanor: average.   Eye Contact: average.   Motor Activity: average.   Speech: clear.   Language: Neurologic language is intact.   Fund of Knowledge: aware of current events, poor fund of knowledge.   Delusions: None Reported.   Self Harm: None Reported.   Aggressive: None Reported.   Mood:. Blunted.   Affect: blunted.   Orientation: alert, oriented x3.   Manner: cooperative.   Thought process: goal-directed.   Thought association: displays rational thought process.   Content of thought: Mr. EMEKA AYALA denies any suicidal or homicidal ideation or plans.   Abstract/ Rational Thought: mild impairment   Memory: grossly intact.   Behavior: calm.   Attention/Concentration: distractible.   Cognition: mild impairment.   Intelligence Estimate: below average.   Executive Function: mild impairment.   Insight: severe impairment.   Judgement: severe impairment.        Psych Review of Symptoms:    ADHD: Patient denied any symptoms.         Anxiety:   Generalized Anxiety Symptoms: Difficulty controlling worry.       Developmental and Sensory Concerns: Patient denied any symptoms.         Depressive Symptoms: Patient denied any symptoms.         Disruptive and Conduct Symptoms: Patient denied any symptoms.         Eating / Feeding Concerns: Patient denied any symptoms.         Elimination Symptoms: Patient denied any symptoms.         Manic Symptoms: Patient denied any symptoms.         Obsessive-Compulsive Symptoms: Patient denied any symptoms.         Psychotic Symptoms: Patient denied any symptoms.           Trauma Related Symptoms: Patient denied any symptoms.           Sleep Concerns: Patient denied any symptoms.             Objective   Physical Exam  Neurological:      Mental Status: He is alert.      Comments: Mental Status Exam     General: In no acute distress.   Appearance: Mild parkinsonian tremor.    Attitude: Cooperative but limited.   Behavior: Significant cognitive, judgment and insight deficits.   Motor Activity: No agitation or retardation. No EPS/TD. Normal gait and station. Parkinsonian features which are mild.   Speech: Parkinsonian features which are mild.   Mood: Blunted.   Affect: Flat.   Thought Process: Significant cognitive, judgment and insight deficits.   Thought Content: Impoverished.   Thought Perception: Does not endorse auditory or visual hallucinations, does not appear to be responding to hallucinatory stimuli.   Cognition: Significant cognitive, judgment and insight deficits.   Insight: Insight is limited.   Judgment: Judgment is limited.         Appearance: well-groomed.   Build: average.   Demeanor: average.   Eye Contact: average.   Motor Activity: average.   Speech: clear.   Language: Neurologic language is intact.   Fund of Knowledge: aware of current events, poor fund of knowledge.   Delusions: None Reported.   Self Harm: None Reported.   Aggressive: None Reported.   Mood:. Blunted.   Affect: blunted.   Orientation: alert, oriented x3.   Manner: cooperative.   Thought process: goal-directed.   Thought association: displays rational thought process.   Content of thought: Mr. EMEKA AYALA denies any suicidal or homicidal ideation or plans.   Abstract/ Rational Thought: mild impairment   Memory: grossly intact.   Behavior: calm.   Attention/Concentration: distractible.   Cognition: mild impairment.   Intelligence Estimate: below average.   Executive Function: mild impairment.   Insight: severe impairment.   Judgement: severe impairment.      Psychiatric:      Comments: Mental Status Exam     General: In no acute distress.   Appearance: Mild parkinsonian tremor.   Attitude: Cooperative but limited.   Behavior: Significant cognitive, judgment and insight deficits.   Motor Activity: No agitation or retardation. No EPS/TD. Normal gait and station. Parkinsonian features which are mild.    Speech: Parkinsonian features which are mild.   Mood: Blunted.   Affect: Flat.   Thought Process: Significant cognitive, judgment and insight deficits.   Thought Content: Impoverished.   Thought Perception: Does not endorse auditory or visual hallucinations, does not appear to be responding to hallucinatory stimuli.   Cognition: Significant cognitive, judgment and insight deficits.   Insight: Insight is limited.   Judgment: Judgment is limited.         Appearance: well-groomed.   Build: average.   Demeanor: average.   Eye Contact: average.   Motor Activity: average.   Speech: clear.   Language: Neurologic language is intact.   Fund of Knowledge: aware of current events, poor fund of knowledge.   Delusions: None Reported.   Self Harm: None Reported.   Aggressive: None Reported.   Mood:. Blunted.   Affect: blunted.   Orientation: alert, oriented x3.   Manner: cooperative.   Thought process: goal-directed.   Thought association: displays rational thought process.   Content of thought: Mr. EMEKA AYALA denies any suicidal or homicidal ideation or plans.   Abstract/ Rational Thought: mild impairment   Memory: grossly intact.   Behavior: calm.   Attention/Concentration: distractible.   Cognition: mild impairment.   Intelligence Estimate: below average.   Executive Function: mild impairment.   Insight: severe impairment.   Judgement: severe impairment.            Lab Review:   No visits with results within 6 Month(s) from this visit.   Latest known visit with results is:   Orders Only on 07/25/2024   Component Date Value    [facility] HIE Valproic Acid 07/25/2024 51.5     [facility] Time Last Dose 07/25/2024 8PM     [facility] H[facility] Date Last Dose 07/25/2024 7/24/24        Assessment/Plan   Psychiatric Risk Assessment  Violence Risk Assessment: none  Acute Risk of Harm to Others is Considered: low   Suicide Risk Assessment: age > 65 yrs old and   Protective Factors against Suicide: adherence to  treatment, fear of  suicide, moral objections to suicide, positive family relationships, and sense of responsibility toward family  Acute Risk of Harm to Self is Considered: low    Imminent Risk of Suicide or Serious Self-Injury: Low   Chronic Risk of Suicide of Serious Self-Injury: Low  Risk factors: Age, depression history and   Protective factors: Denies current suicidal ideation, denies history of suicide attempts , willingness to seek help and support , gender, access to a variety of clinical interventions , and receiving and engaged in care for mental, physical, and substance use disorders      Imminent Risk of Violence or Homicide: Low   Risk Factors: No significant risk factors identified on screening  Protective Factors: Lack of known history of harm to others , Lack of known history of violent ideation , and lack of known access to firearms.     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     Treatment plan:  The FDA risks, benefits & alternatives to the medications prescribed were explained to you and your support person, your brother, Marcus Fontaine, who is your guardian with your sister in law, today. You & your support persons were able to understand & repeat these risks, benefits & alternatives to these prescribed medications. You and your support persons have agreed to proceed with treatment with the medications discussed based on the conclusion that the benefit outweighs the risks of this treatment regimen: continue quetiapine 25 mg at bedtime, continue Depakote  mg for now with plan to attempt to decrease and possibly eliminate as discussed, continue sertraline 25 mg with dinner daily.  Follow up in a month and in late September of 2025.       Schizoaffective disorder, mixed type (Multi)  Diagnosis: schizoaffective disorder in substantial remission and obsessive compulsive  disorder improved.     Treatment plan:  The FDA risks, benefits & alternatives to the medications prescribed were explained to you and your support person, your brother, Marcus Fontaine, who is your guardian with your sister in law, today. You & your support persons were able to understand & repeat these risks, benefits & alternatives to these prescribed medications. You and your support persons have agreed to proceed with treatment with the medications discussed based on the conclusion that the benefit outweighs the risks of this treatment regimen: continue quetiapine 25 mg at bedtime, continue Depakote  mg for now with plan to attempt to decrease and possibly eliminate as discussed, continue sertraline 25 mg with dinner daily.  Follow up in a month and in late September of 2025.       Neurocognitive deficits  Diagnosis: schizoaffective disorder in substantial remission and obsessive compulsive disorder improved.     Treatment plan:  The FDA risks, benefits & alternatives to the medications prescribed were explained to you and your support person, your brother, Marcus Fontaine, who is your guardian with your sister in law, today. You & your support persons were able to understand & repeat these risks, benefits & alternatives to these prescribed medications. You and your support persons have agreed to proceed with treatment with the medications discussed based on the conclusion that the benefit outweighs the risks of this treatment regimen: continue quetiapine 25 mg at bedtime, continue Depakote  mg for now with plan to attempt to decrease and possibly eliminate as discussed, continue sertraline 25 mg with dinner daily.  Follow up in a month and in late September of 2025.       Follow up with neurology for Parkinson's disease and evaluation for autonomic function as discussed.    Time:   Prep time on date of the patient encounter: 5 minutes.   Time spent directly with patient/family/caregiver: 30 minutes.    Additional time spent on patient care activities:  minutes.   Documentation time: 5 minutes.   Total time on date of patient encounter: 40 minutes.

## 2025-04-28 NOTE — ASSESSMENT & PLAN NOTE
Diagnosis: schizoaffective disorder in substantial remission and obsessive compulsive disorder improved.     Treatment plan:  The FDA risks, benefits & alternatives to the medications prescribed were explained to you and your support person, your brother, Marcus Fontaine, who is your guardian with your sister in law, today. You & your support persons were able to understand & repeat these risks, benefits & alternatives to these prescribed medications. You and your support persons have agreed to proceed with treatment with the medications discussed based on the conclusion that the benefit outweighs the risks of this treatment regimen: continue quetiapine 25 mg at bedtime, continue Depakote  mg for now with plan to attempt to decrease and possibly eliminate as discussed, continue sertraline 25 mg with dinner daily.  Follow up in a month and in late September of 2025.

## 2025-05-16 DIAGNOSIS — F25.0 SCHIZOAFFECTIVE DISORDER, MIXED TYPE (MULTI): ICD-10-CM

## 2025-05-16 DIAGNOSIS — F03.918 PSYCHOSIS IN ELDERLY WITH BEHAVIORAL DISTURBANCE (MULTI): ICD-10-CM

## 2025-05-16 DIAGNOSIS — R46.81 OBSESSIVE-COMPULSIVE BEHAVIOR: ICD-10-CM

## 2025-05-16 RX ORDER — DIVALPROEX SODIUM 500 MG/1
500 TABLET, DELAYED RELEASE ORAL NIGHTLY
Qty: 90 TABLET | Refills: 1 | Status: SHIPPED | OUTPATIENT
Start: 2025-05-16 | End: 2025-11-12

## 2025-05-17 DIAGNOSIS — N40.1 BENIGN PROSTATIC HYPERPLASIA WITH INCOMPLETE BLADDER EMPTYING: Primary | ICD-10-CM

## 2025-05-17 DIAGNOSIS — R39.14 BENIGN PROSTATIC HYPERPLASIA WITH INCOMPLETE BLADDER EMPTYING: Primary | ICD-10-CM

## 2025-05-17 RX ORDER — TAMSULOSIN HYDROCHLORIDE 0.4 MG/1
0.4 CAPSULE ORAL DAILY
Qty: 90 CAPSULE | Refills: 2 | Status: SHIPPED | OUTPATIENT
Start: 2025-05-17 | End: 2026-02-11

## 2025-05-22 DIAGNOSIS — N40.1 BENIGN PROSTATIC HYPERPLASIA WITH INCOMPLETE BLADDER EMPTYING: ICD-10-CM

## 2025-05-22 DIAGNOSIS — R39.14 BENIGN PROSTATIC HYPERPLASIA WITH INCOMPLETE BLADDER EMPTYING: ICD-10-CM

## 2025-05-22 DIAGNOSIS — N32.81 OAB (OVERACTIVE BLADDER): Primary | ICD-10-CM

## 2025-05-22 RX ORDER — TAMSULOSIN HYDROCHLORIDE 0.4 MG/1
0.8 CAPSULE ORAL DAILY
Qty: 180 CAPSULE | Refills: 2 | Status: SHIPPED | OUTPATIENT
Start: 2025-05-22 | End: 2026-02-16

## 2025-05-22 RX ORDER — MIRABEGRON 25 MG/1
25 TABLET, FILM COATED, EXTENDED RELEASE ORAL DAILY
Qty: 90 TABLET | Refills: 2 | Status: SHIPPED | OUTPATIENT
Start: 2025-05-22

## 2025-05-30 ENCOUNTER — APPOINTMENT (OUTPATIENT)
Dept: BEHAVIORAL HEALTH | Facility: CLINIC | Age: 80
End: 2025-05-30
Payer: MEDICARE

## 2025-05-30 DIAGNOSIS — F03.918 PSYCHOSIS IN ELDERLY WITH BEHAVIORAL DISTURBANCE (MULTI): ICD-10-CM

## 2025-05-30 DIAGNOSIS — R46.81 OBSESSIVE-COMPULSIVE BEHAVIOR: ICD-10-CM

## 2025-05-30 DIAGNOSIS — F25.0 SCHIZOAFFECTIVE DISORDER, MIXED TYPE (MULTI): ICD-10-CM

## 2025-05-30 PROCEDURE — 99214 OFFICE O/P EST MOD 30 MIN: CPT | Performed by: PSYCHIATRY & NEUROLOGY

## 2025-05-30 RX ORDER — QUETIAPINE FUMARATE 25 MG/1
25 TABLET, FILM COATED ORAL NIGHTLY
Qty: 90 TABLET | Refills: 1 | Status: SHIPPED | OUTPATIENT
Start: 2025-05-30 | End: 2025-11-26

## 2025-05-30 ASSESSMENT — ENCOUNTER SYMPTOMS: NERVOUS/ANXIOUS: 1

## 2025-05-30 NOTE — ASSESSMENT & PLAN NOTE
Diagnosis: schizoaffective disorder in substantial remission and obsessive compulsive disorder improved.     Treatment plan:  The FDA risks, benefits & alternatives to the medications prescribed were explained to you and your support person, your brother, Marcus Fontaine, who is your guardian with your sister in law, today. You & your support persons were able to understand & repeat these risks, benefits & alternatives to these prescribed medications. You and your support persons have agreed to proceed with treatment with the medications discussed based on the conclusion that the benefit outweighs the risks of this treatment regimen: continue quetiapine 25 mg at bedtime, continue Depakote  mg for now with plan to attempt to decrease and possibly eliminate as discussed, continue sertraline 25 mg with dinner daily.  Follow up in late September of 2025.  Orders:    QUEtiapine (SEROquel) 25 mg tablet; Take 1 tablet (25 mg) by mouth once daily at bedtime.

## 2025-05-30 NOTE — PROGRESS NOTES
Subjective   Patient ID: Zachery Fontaine is a 79 y.o. male who presents for No chief complaint on file..    Virtual Consent: The patient engaged in a telehealth session via Epic audio visual or phone with this provider practicing within the [facility]. The identity of the patient was verified by their date of birth and last four digits of their social security number. The provider demonstrated that confidentially was preserved at their location. The patient was informed that they were responsible for ensuring confidentially was secured at their location. The patient's location was documented for emergency purposes. The patient was informed of the necessary steps that would occur if an emergency was to occur or technology failed during session.    An interactive audio and video telecommunication system which permits real time communications between the patient (at the originating site at his residence) and provider (at the distant site at home office) was utilized to provide this telehealth service.   Verbal consent was requested and obtained from Zachery Fontaine on this date, 5/30/25 for a telehealth visit.      HPI: The patient is seen with his brother, Marcus, his sister in law throughout the appointment today. The patient had recent unresponsive episodes and neurology is recommending autonomic testing. The patient has been more anxious.      Past Medical History:  04/05/2023: Abnormality of gait due to impairment of balance  04/05/2023: BPH (benign prostatic hyperplasia)  11/02/2023: Chronic constipation  04/09/2020: COVID-19      Comment:  COVID-19 virus infection  05/06/2020: COVID-19      Comment:  Pneumonia due to severe acute respiratory syndrome                coronavirus 2 (SARS-CoV-2)  04/05/2023: Debility  03/29/2016: Depressed  09/08/2016: Elevated prostate specific antigen (PSA)      Comment:  Abnormal PSA  04/05/2023: GERD (gastroesophageal reflux disease)  04/05/2023: Hyperlipidemia  04/05/2023:  Multiple falls  04/05/2023: Neurocognitive deficits  04/05/2023: Obsessive-compulsive behavior  04/05/2023: Osteoarthritis, generalized  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)      Comment:  Drug-induced parkinsonism  04/05/2023: Parkinsons disease (Multi)  04/05/2023: 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)  04/05/2023: Right bundle branch block (RBBB) on electrocardiography  04/05/2023: Schizoaffective disorder, mixed type (Multi)  No date: Unilateral inguinal hernia, without obstruction or gangrene,   not specified as recurrent      Comment:  Inguinal hernia  Active Problems  Problems    · Abnormality of gait due to impairment of balance (781.2) (R26.89)   · Alzheimer's dementia with behavioral disturbance (331.0,294.11) (G30.9,F02.818)   · Anxiety with depression (300.4) (F41.8)   · Bilateral impacted cerumen (380.4) (H61.23)   · BPH (benign prostatic hyperplasia) (600.00) (N40.0)   · Chronic constipation (564.00) (K59.09)   · Class 2 severe obesity with serious comorbidity and body mass index (BMI) of 37.0 to  37.9 in adult (278.01,V85.37) (E66.01,Z68.37)   · Colon cancer screening (V76.51) (Z12.11)   · COPD (chronic obstructive pulmonary disease) (496) (J44.9)   · Debility (799.3) (R53.81)   · Fatigue (780.79) (R53.83)   · Generalized weakness (780.79) (R53.1)   · GERD (gastroesophageal reflux disease) (530.81) (K21.9)   · Hyperlipidemia (272.4) (E78.5)   · Long term use of drug (V58.69) (Z79.899)   · Medicare annual wellness visit, subsequent (V70.0) (Z00.00)   · Multiple falls (V15.88) (R29.6)   · Neurocognitive deficits (781.99) (R29.818,R41.89)   · Obsessive-compulsive behavior (300.3) (R46.81)   · Parkinsons disease (332.0) (G20)   · DATscan positive 2019   · 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 (290.20) (F03.918)   · 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   · Schizoaffective disorder, mixed type (295.70) (F25.0)   · 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.52)   · Venous insufficiency (459.81) (I87.2)   · Vitamin D deficiency (268.9) (E55.9)     Past Medical History  Problems    · History of Abnormal PSA (790.93) (R97.20)   · Resolved Date: 2018   · History of Bronchitis with bronchospasm (490) (J20.9)   · Resolved Date: 2015   · History of COVID-19 virus infection (079.89) (U07.1)   · Resolved Date: 15 Sep 2020   · 20   · History of Drug-induced parkinsonism (332.1,E980.5) (G21.19)   · Resolved Date: 06 Dec 2019   · History of Erectile dysfunction (607.84) (N52.9)   · History of hiatal hernia (V12.79) (Z87.19)   · History of nuclear stress test (V15.89) (Z92.89)   · History of paranoid schizophrenia (V11.0) (Z86.59)   · Resolved Date: 2019   · History of testicular mass (V13.89) (Z87.438)   · [place] 1-3-2012 benign mass   · History of Inguinal hernia (550.90) (K40.90)   · History of Pneumonia due to severe acute respiratory syndrome coronavirus 2  (SARS-CoV-2) (480.8,079.89) (U07.1,J12.82)     Surgical History  Problems    · History of Cardiovascular Stress Test   · Stress/ECHO 1-3-2013 WNL   · History of Inguinal Hernia Repair   · History of Mohs surgery     Family History  Mother    · Family history of    · Family history of congestive heart failure (V17.49) (Z82.49)  Father    · Family history of    · Family history of congestive heart failure (V17.49) (Z82.49)  Maternal Aunt    · Family history of Bipolar illness  Maternal Cousin    · Family history of Bipolar illness     Social History  Problems    · Living in assisted living (V60.6) (Z59.3)   · Never a smoker   · Parent   · Retired   ·         Mental Status Exam     General: In no acute distress.   Appearance: Mild parkinsonian tremor.   Attitude: Cooperative but limited.   Behavior: Significant cognitive, judgment and insight deficits.   Motor Activity: No agitation or retardation. No EPS/TD. Normal gait and station. Parkinsonian  features which are mild.   Speech: Parkinsonian features which are mild.   Mood: Blunted.   Affect: Flat.   Thought Process: Significant cognitive, judgment and insight deficits.   Thought Content: Impoverished.   Thought Perception: Does not endorse auditory or visual hallucinations, does not appear to be responding to hallucinatory stimuli.   Cognition: Significant cognitive, judgment and insight deficits.   Insight: Insight is limited.   Judgment: Judgment is limited.         Appearance: well-groomed.   Build: average.   Demeanor: average.   Eye Contact: average.   Motor Activity: average.   Speech: clear.   Language: Neurologic language is intact.   Fund of Knowledge: aware of current events, poor fund of knowledge.   Delusions: None Reported.   Self Harm: None Reported.   Aggressive: None Reported.   Mood:. Blunted.   Affect: blunted.   Orientation: alert, oriented x3.   Manner: cooperative.   Thought process: goal-directed.   Thought association: displays rational thought process.   Content of thought: Mr. EMEKA AYALA denies any suicidal or homicidal ideation or plans.   Abstract/ Rational Thought: mild impairment   Memory: grossly intact.   Behavior: calm.   Attention/Concentration: distractible.   Cognition: mild impairment.   Intelligence Estimate: below average.   Executive Function: mild impairment.   Insight: Significant impairment.   Judgement: Significant impairment.         Review of Systems   Neurological:         Mental Status Exam     General: In no acute distress.   Appearance: Mild parkinsonian tremor.   Attitude: Cooperative but limited.   Behavior: Significant cognitive, judgment and insight deficits.   Motor Activity: No agitation or retardation. No EPS/TD. Normal gait and station. Parkinsonian features which are mild.   Speech: Parkinsonian features which are mild.   Mood: Blunted.   Affect: Flat.   Thought Process: Significant cognitive, judgment and insight deficits.   Thought Content:  Impoverished.   Thought Perception: Does not endorse auditory or visual hallucinations, does not appear to be responding to hallucinatory stimuli.   Cognition: Significant cognitive, judgment and insight deficits.   Insight: Insight is limited.   Judgment: Judgment is limited.         Appearance: well-groomed.   Build: average.   Demeanor: average.   Eye Contact: average.   Motor Activity: average.   Speech: clear.   Language: Neurologic language is intact.   Fund of Knowledge: aware of current events, poor fund of knowledge.   Delusions: None Reported.   Self Harm: None Reported.   Aggressive: None Reported.   Mood:. Blunted.   Affect: blunted.   Orientation: alert, oriented x3.   Manner: cooperative.   Thought process: goal-directed.   Thought association: displays rational thought process.   Content of thought: Mr. EMEKA AYALA denies any suicidal or homicidal ideation or plans.   Abstract/ Rational Thought: mild impairment   Memory: grossly intact.   Behavior: calm.   Attention/Concentration: distractible.   Cognition: mild impairment.   Intelligence Estimate: below average.   Executive Function: mild impairment.   Insight: Significant impairment.   Judgement: Significant impairment.      Psychiatric/Behavioral:  The patient is nervous/anxious.         Mental Status Exam     General: In no acute distress.   Appearance: Mild parkinsonian tremor.   Attitude: Cooperative but limited.   Behavior: Significant cognitive, judgment and insight deficits.   Motor Activity: No agitation or retardation. No EPS/TD. Normal gait and station. Parkinsonian features which are mild.   Speech: Parkinsonian features which are mild.   Mood: Blunted.   Affect: Flat.   Thought Process: Significant cognitive, judgment and insight deficits.   Thought Content: Impoverished.   Thought Perception: Does not endorse auditory or visual hallucinations, does not appear to be responding to hallucinatory stimuli.   Cognition: Significant  cognitive, judgment and insight deficits.   Insight: Insight is limited.   Judgment: Judgment is limited.         Appearance: well-groomed.   Build: average.   Demeanor: average.   Eye Contact: average.   Motor Activity: average.   Speech: clear.   Language: Neurologic language is intact.   Fund of Knowledge: aware of current events, poor fund of knowledge.   Delusions: None Reported.   Self Harm: None Reported.   Aggressive: None Reported.   Mood:. Blunted.   Affect: blunted.   Orientation: alert, oriented x3.   Manner: cooperative.   Thought process: goal-directed.   Thought association: displays rational thought process.   Content of thought: Mr. EMEKA AYALA denies any suicidal or homicidal ideation or plans.   Abstract/ Rational Thought: mild impairment   Memory: grossly intact.   Behavior: calm.   Attention/Concentration: distractible.   Cognition: mild impairment.   Intelligence Estimate: below average.   Executive Function: mild impairment.   Insight: Significant impairment.   Judgement: Significant impairment.      All other systems reviewed and are negative.    Psych Review of Symptoms:    ADHD:   Inattention Symptoms: Difficulty with follow through.       Anxiety:   Generalized Anxiety Symptoms: Difficulty controlling worry.       Developmental and Sensory Concerns: Patient denied any symptoms.         Depressive Symptoms: Patient denied any symptoms.         Disruptive and Conduct Symptoms: Patient denied any symptoms.         Eating / Feeding Concerns: Patient denied any symptoms.         Elimination Symptoms: Patient denied any symptoms.         Manic Symptoms: Patient denied any symptoms.         Obsessive-Compulsive Symptoms:   Obsessive behaviors.       Psychotic Symptoms: Patient denied any symptoms.           Trauma Related Symptoms: Patient denied any symptoms.           Sleep Concerns: Patient denied any symptoms.             Objective   Physical Exam  Neurological:      Mental Status: He is  alert.      Comments: Mental Status Exam     General: In no acute distress.   Appearance: Mild parkinsonian tremor.   Attitude: Cooperative but limited.   Behavior: Significant cognitive, judgment and insight deficits.   Motor Activity: No agitation or retardation. No EPS/TD. Normal gait and station. Parkinsonian features which are mild.   Speech: Parkinsonian features which are mild.   Mood: Blunted.   Affect: Flat.   Thought Process: Significant cognitive, judgment and insight deficits.   Thought Content: Impoverished.   Thought Perception: Does not endorse auditory or visual hallucinations, does not appear to be responding to hallucinatory stimuli.   Cognition: Significant cognitive, judgment and insight deficits.   Insight: Insight is limited.   Judgment: Judgment is limited.         Appearance: well-groomed.   Build: average.   Demeanor: average.   Eye Contact: average.   Motor Activity: average.   Speech: clear.   Language: Neurologic language is intact.   Fund of Knowledge: aware of current events, poor fund of knowledge.   Delusions: None Reported.   Self Harm: None Reported.   Aggressive: None Reported.   Mood:. Blunted.   Affect: blunted.   Orientation: alert, oriented x3.   Manner: cooperative.   Thought process: goal-directed.   Thought association: displays rational thought process.   Content of thought: Mr. EMEKA AYALA denies any suicidal or homicidal ideation or plans.   Abstract/ Rational Thought: mild impairment   Memory: grossly intact.   Behavior: calm.   Attention/Concentration: distractible.   Cognition: mild impairment.   Intelligence Estimate: below average.   Executive Function: mild impairment.   Insight: Significant impairment.   Judgement: Significant impairment.      Psychiatric:      Comments: Mental Status Exam     General: In no acute distress.   Appearance: Mild parkinsonian tremor.   Attitude: Cooperative but limited.   Behavior: Significant cognitive, judgment and insight  deficits.   Motor Activity: No agitation or retardation. No EPS/TD. Normal gait and station. Parkinsonian features which are mild.   Speech: Parkinsonian features which are mild.   Mood: Blunted.   Affect: Flat.   Thought Process: Significant cognitive, judgment and insight deficits.   Thought Content: Impoverished.   Thought Perception: Does not endorse auditory or visual hallucinations, does not appear to be responding to hallucinatory stimuli.   Cognition: Significant cognitive, judgment and insight deficits.   Insight: Insight is limited.   Judgment: Judgment is limited.         Appearance: well-groomed.   Build: average.   Demeanor: average.   Eye Contact: average.   Motor Activity: average.   Speech: clear.   Language: Neurologic language is intact.   Fund of Knowledge: aware of current events, poor fund of knowledge.   Delusions: None Reported.   Self Harm: None Reported.   Aggressive: None Reported.   Mood:. Blunted.   Affect: blunted.   Orientation: alert, oriented x3.   Manner: cooperative.   Thought process: goal-directed.   Thought association: displays rational thought process.   Content of thought: Mr. EMEKA AYALA denies any suicidal or homicidal ideation or plans.   Abstract/ Rational Thought: mild impairment   Memory: grossly intact.   Behavior: calm.   Attention/Concentration: distractible.   Cognition: mild impairment.   Intelligence Estimate: below average.   Executive Function: mild impairment.   Insight: Significant impairment.   Judgement: Significant impairment.            Lab Review:   No visits with results within 6 Month(s) from this visit.   Latest known visit with results is:   Orders Only on 07/25/2024   Component Date Value    [facility] HIE Valproic Acid 07/25/2024 51.5     [facility] Time Last Dose 07/25/2024 8PM     [facility] H[facility] Date Last Dose 07/25/2024 7/24/24        Assessment/Plan   Psychiatric Risk Assessment  Violence Risk Assessment: none  Acute Risk of Harm to Others is  Considered: low   Suicide Risk Assessment: age > 65 yrs old and   Protective Factors against Suicide: adherence to  treatment, fear of suicide, moral objections to suicide, positive family relationships, and sense of responsibility toward family  Acute Risk of Harm to Self is Considered: low    Imminent Risk of Suicide or Serious Self-Injury: Low   Chronic Risk of Suicide of Serious Self-Injury: Low  Risk factors: Age, depression history and   Protective factors: Denies current suicidal ideation, denies history of suicide attempts , willingness to seek help and support , gender, access to a variety of clinical interventions , and receiving and engaged in care for mental, physical, and substance use disorders      Imminent Risk of Violence or Homicide: Low   Risk Factors: No significant risk factors identified on screening  Protective Factors: Lack of known history of harm to others , Lack of known history of violent ideation , and lack of known access to firearms.     Assessment & Plan  Schizoaffective disorder, mixed type (Multi)  Diagnosis: schizoaffective disorder in substantial remission and obsessive compulsive disorder improved.     Treatment plan:  The FDA risks, benefits & alternatives to the medications prescribed were explained to you and your support person, your brother, Marcus Fontaine, who is your guardian with your sister in law, today. You & your support persons were able to understand & repeat these risks, benefits & alternatives to these prescribed medications. You and your support persons have agreed to proceed with treatment with the medications discussed based on the conclusion that the benefit outweighs the risks of this treatment regimen: continue quetiapine 25 mg at bedtime, continue Depakote  mg for now with plan to attempt to decrease and possibly eliminate as discussed, continue sertraline 25 mg with dinner daily.  Follow up in late September of 2025.  Orders:    QUEtiapine  (SEROquel) 25 mg tablet; Take 1 tablet (25 mg) by mouth once daily at bedtime.    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     Treatment plan:  The FDA risks, benefits & alternatives to the medications prescribed were explained to you and your support person, your brother, Marcus Fontaine, who is your guardian with your sister in law, today. You & your support persons were able to understand & repeat these risks, benefits & alternatives to these prescribed medications. You and your support persons have agreed to proceed with treatment with the medications discussed based on the conclusion that the benefit outweighs the risks of this treatment regimen: continue quetiapine 25 mg at bedtime, continue Depakote  mg for now with plan to attempt to decrease and possibly eliminate as discussed, continue sertraline 25 mg with dinner daily.  Follow up in late September of 2025.  Orders:    QUEtiapine (SEROquel) 25 mg tablet; Take 1 tablet (25 mg) by mouth once daily at bedtime.    Obsessive-compulsive behavior  Diagnosis: schizoaffective disorder in substantial remission and obsessive compulsive disorder improved.     Treatment plan:  The FDA risks, benefits & alternatives to the medications prescribed were explained to you and your support person, your brother, Marcus Fontaine, who is your guardian with your sister in law, today. You & your support persons were able to understand & repeat these risks, benefits & alternatives to these prescribed medications. You and your support persons have agreed to proceed with treatment with the medications discussed based on the conclusion that the benefit outweighs the risks of this treatment regimen: continue quetiapine 25 mg at bedtime, continue Depakote  mg for now with plan to attempt to decrease and possibly eliminate as discussed, continue sertraline 25 mg  with dinner daily.  Follow up in late September of 2025.  Orders:    QUEtiapine (SEROquel) 25 mg tablet; Take 1 tablet (25 mg) by mouth once daily at bedtime.       Time:   Prep time on date of the patient encounter: 5 minutes.   Time spent directly with patient/family/caregiver: 20 minutes.   Additional time spent on patient care activities:  minutes.   Documentation time: 5 minutes.   Total time on date of patient encounter: 30 minutes.

## 2025-08-13 ENCOUNTER — HOSPITAL ENCOUNTER (OUTPATIENT)
Facility: CLINIC | Age: 80
Discharge: HOME | End: 2025-08-13
Payer: MEDICARE

## 2025-08-13 DIAGNOSIS — I95.1 ORTHOSTASIS: ICD-10-CM

## 2025-08-18 ENCOUNTER — APPOINTMENT (OUTPATIENT)
Facility: CLINIC | Age: 80
End: 2025-08-18
Payer: MEDICARE

## 2025-08-18 ENCOUNTER — HOSPITAL ENCOUNTER (OUTPATIENT)
Dept: NEUROLOGY | Facility: CLINIC | Age: 80
Discharge: HOME | End: 2025-08-18
Payer: MEDICARE

## 2025-08-18 ENCOUNTER — APPOINTMENT (OUTPATIENT)
Dept: NEUROLOGY | Facility: CLINIC | Age: 80
End: 2025-08-18
Payer: MEDICARE

## 2025-08-18 PROCEDURE — 95924 ANS PARASYMP & SYMP W/TILT: CPT | Performed by: PSYCHIATRY & NEUROLOGY

## 2025-08-18 PROCEDURE — 95923 AUTONOMIC NRV SYST FUNJ TEST: CPT | Performed by: PSYCHIATRY & NEUROLOGY

## 2025-08-19 ENCOUNTER — APPOINTMENT (OUTPATIENT)
Dept: NEUROLOGY | Facility: CLINIC | Age: 80
End: 2025-08-19
Payer: MEDICARE

## 2025-08-20 ENCOUNTER — TELEPHONE (OUTPATIENT)
Dept: NEUROLOGY | Facility: CLINIC | Age: 80
End: 2025-08-20

## 2025-08-20 ENCOUNTER — OFFICE VISIT (OUTPATIENT)
Dept: NEUROLOGY | Facility: CLINIC | Age: 80
End: 2025-08-20
Payer: MEDICARE

## 2025-08-20 VITALS
TEMPERATURE: 98.1 F | HEIGHT: 70 IN | HEART RATE: 81 BPM | SYSTOLIC BLOOD PRESSURE: 170 MMHG | WEIGHT: 203 LBS | DIASTOLIC BLOOD PRESSURE: 140 MMHG | BODY MASS INDEX: 29.06 KG/M2

## 2025-08-20 DIAGNOSIS — I95.1 ORTHOSTATIC HYPOTENSION DYSAUTONOMIC SYNDROME: Primary | ICD-10-CM

## 2025-08-20 DIAGNOSIS — G20.C PARKINSONISM, UNSPECIFIED PARKINSONISM TYPE (MULTI): ICD-10-CM

## 2025-08-20 PROCEDURE — G2211 COMPLEX E/M VISIT ADD ON: HCPCS | Performed by: PSYCHIATRY & NEUROLOGY

## 2025-08-20 PROCEDURE — 99214 OFFICE O/P EST MOD 30 MIN: CPT | Performed by: PSYCHIATRY & NEUROLOGY

## 2025-08-20 PROCEDURE — 1159F MED LIST DOCD IN RCRD: CPT | Performed by: PSYCHIATRY & NEUROLOGY

## 2025-08-20 PROCEDURE — 1125F AMNT PAIN NOTED PAIN PRSNT: CPT | Performed by: PSYCHIATRY & NEUROLOGY

## 2025-08-20 PROCEDURE — 1160F RVW MEDS BY RX/DR IN RCRD: CPT | Performed by: PSYCHIATRY & NEUROLOGY

## 2025-08-20 RX ORDER — PYRIDOSTIGMINE BROMIDE 60 MG/1
60 TABLET ORAL 3 TIMES DAILY
Qty: 270 TABLET | Refills: 3 | Status: SHIPPED | OUTPATIENT
Start: 2025-08-20 | End: 2026-08-20

## 2025-08-20 RX ORDER — SENNOSIDES 8.6 MG/1
1 TABLET ORAL 2 TIMES DAILY
COMMUNITY
Start: 2025-08-18

## 2025-08-20 RX ORDER — CARBIDOPA AND LEVODOPA 25; 100 MG/1; MG/1
TABLET ORAL
Qty: 270 TABLET | Refills: 3 | Status: SHIPPED | OUTPATIENT
Start: 2025-08-20

## 2025-08-20 RX ORDER — CLOTRIMAZOLE 1 %
CREAM (GRAM) TOPICAL
COMMUNITY
Start: 2025-08-12

## 2025-08-20 ASSESSMENT — PATIENT HEALTH QUESTIONNAIRE - PHQ9
SUM OF ALL RESPONSES TO PHQ9 QUESTIONS 1 AND 2: 4
SUM OF ALL RESPONSES TO PHQ QUESTIONS 1-9: 14
2. FEELING DOWN, DEPRESSED OR HOPELESS: MORE THAN HALF THE DAYS
3. TROUBLE FALLING OR STAYING ASLEEP OR SLEEPING TOO MUCH: NEARLY EVERY DAY
9. THOUGHTS THAT YOU WOULD BE BETTER OFF DEAD, OR OF HURTING YOURSELF: NOT AT ALL
7. TROUBLE CONCENTRATING ON THINGS, SUCH AS READING THE NEWSPAPER OR WATCHING TELEVISION: NOT AT ALL
5. POOR APPETITE OR OVEREATING: MORE THAN HALF THE DAYS
6. FEELING BAD ABOUT YOURSELF - OR THAT YOU ARE A FAILURE OR HAVE LET YOURSELF OR YOUR FAMILY DOWN: NOT AT ALL
1. LITTLE INTEREST OR PLEASURE IN DOING THINGS: MORE THAN HALF THE DAYS
8. MOVING OR SPEAKING SO SLOWLY THAT OTHER PEOPLE COULD HAVE NOTICED. OR THE OPPOSITE, BEING SO FIGETY OR RESTLESS THAT YOU HAVE BEEN MOVING AROUND A LOT MORE THAN USUAL: NEARLY EVERY DAY
4. FEELING TIRED OR HAVING LITTLE ENERGY: MORE THAN HALF THE DAYS

## 2025-08-20 ASSESSMENT — PAIN SCALES - GENERAL: PAINLEVEL_OUTOF10: 3

## 2025-08-20 ASSESSMENT — ENCOUNTER SYMPTOMS
LOSS OF SENSATION IN FEET: 0
OCCASIONAL FEELINGS OF UNSTEADINESS: 1
DEPRESSION: 0

## 2025-08-22 ENCOUNTER — APPOINTMENT (OUTPATIENT)
Dept: PRIMARY CARE | Facility: CLINIC | Age: 80
End: 2025-08-22
Payer: MEDICARE

## 2025-08-22 ENCOUNTER — TELEPHONE (OUTPATIENT)
Dept: NEUROLOGY | Facility: CLINIC | Age: 80
End: 2025-08-22

## 2025-08-22 VITALS
OXYGEN SATURATION: 93 % | DIASTOLIC BLOOD PRESSURE: 69 MMHG | HEART RATE: 97 BPM | WEIGHT: 194 LBS | BODY MASS INDEX: 31.18 KG/M2 | SYSTOLIC BLOOD PRESSURE: 131 MMHG | HEIGHT: 66 IN

## 2025-08-22 DIAGNOSIS — E78.5 HYPERLIPIDEMIA, UNSPECIFIED HYPERLIPIDEMIA TYPE: ICD-10-CM

## 2025-08-22 DIAGNOSIS — B37.2 INTERTRIGO OF GENITOCRURAL REGION DUE TO CANDIDA SPECIES: ICD-10-CM

## 2025-08-22 DIAGNOSIS — E66.01 SEVERE OBESITY (MULTI): ICD-10-CM

## 2025-08-22 DIAGNOSIS — K21.9 GASTROESOPHAGEAL REFLUX DISEASE WITHOUT ESOPHAGITIS: ICD-10-CM

## 2025-08-22 DIAGNOSIS — E55.9 VITAMIN D DEFICIENCY: ICD-10-CM

## 2025-08-22 DIAGNOSIS — N40.1 BENIGN PROSTATIC HYPERPLASIA WITH INCOMPLETE BLADDER EMPTYING: ICD-10-CM

## 2025-08-22 DIAGNOSIS — Z12.5 SCREENING PSA (PROSTATE SPECIFIC ANTIGEN): ICD-10-CM

## 2025-08-22 DIAGNOSIS — N32.81 OAB (OVERACTIVE BLADDER): ICD-10-CM

## 2025-08-22 DIAGNOSIS — J44.9 CHRONIC OBSTRUCTIVE PULMONARY DISEASE, UNSPECIFIED COPD TYPE (MULTI): ICD-10-CM

## 2025-08-22 DIAGNOSIS — K59.04 CHRONIC IDIOPATHIC CONSTIPATION: ICD-10-CM

## 2025-08-22 DIAGNOSIS — I95.1 ORTHOSTATIC HYPOTENSION: ICD-10-CM

## 2025-08-22 DIAGNOSIS — Z00.00 HEALTH MAINTENANCE EXAMINATION: Primary | ICD-10-CM

## 2025-08-22 DIAGNOSIS — R53.83 OTHER FATIGUE: ICD-10-CM

## 2025-08-22 DIAGNOSIS — Z79.899 LONG TERM USE OF DRUG: ICD-10-CM

## 2025-08-22 DIAGNOSIS — R35.0 BENIGN PROSTATIC HYPERPLASIA WITH URINARY FREQUENCY: ICD-10-CM

## 2025-08-22 DIAGNOSIS — M15.9 OSTEOARTHRITIS, GENERALIZED: ICD-10-CM

## 2025-08-22 DIAGNOSIS — N40.1 BENIGN PROSTATIC HYPERPLASIA WITH URINARY FREQUENCY: ICD-10-CM

## 2025-08-22 DIAGNOSIS — Z00.00 MEDICARE ANNUAL WELLNESS VISIT, SUBSEQUENT: ICD-10-CM

## 2025-08-22 DIAGNOSIS — R39.14 BENIGN PROSTATIC HYPERPLASIA WITH INCOMPLETE BLADDER EMPTYING: ICD-10-CM

## 2025-08-22 PROCEDURE — 1170F FXNL STATUS ASSESSED: CPT | Performed by: FAMILY MEDICINE

## 2025-08-22 PROCEDURE — 1159F MED LIST DOCD IN RCRD: CPT | Performed by: FAMILY MEDICINE

## 2025-08-22 PROCEDURE — 99397 PER PM REEVAL EST PAT 65+ YR: CPT | Performed by: FAMILY MEDICINE

## 2025-08-22 PROCEDURE — G0439 PPPS, SUBSEQ VISIT: HCPCS | Performed by: FAMILY MEDICINE

## 2025-08-22 PROCEDURE — 99213 OFFICE O/P EST LOW 20 MIN: CPT | Performed by: FAMILY MEDICINE

## 2025-08-22 PROCEDURE — 99497 ADVNCD CARE PLAN 30 MIN: CPT | Performed by: FAMILY MEDICINE

## 2025-08-22 PROCEDURE — 93000 ELECTROCARDIOGRAM COMPLETE: CPT | Performed by: FAMILY MEDICINE

## 2025-08-22 ASSESSMENT — ACTIVITIES OF DAILY LIVING (ADL)
DRESSING: NEEDS ASSISTANCE
GROCERY_SHOPPING: NEEDS ASSISTANCE
BATHING: NEEDS ASSISTANCE
MANAGING_FINANCES: NEEDS ASSISTANCE
DOING_HOUSEWORK: NEEDS ASSISTANCE
TAKING_MEDICATION: NEEDS ASSISTANCE

## 2025-08-22 ASSESSMENT — ENCOUNTER SYMPTOMS
LOSS OF SENSATION IN FEET: 0
DEPRESSION: 1
OCCASIONAL FEELINGS OF UNSTEADINESS: 1

## 2025-08-22 ASSESSMENT — PATIENT HEALTH QUESTIONNAIRE - PHQ9
1. LITTLE INTEREST OR PLEASURE IN DOING THINGS: NOT AT ALL
2. FEELING DOWN, DEPRESSED OR HOPELESS: NOT AT ALL
1. LITTLE INTEREST OR PLEASURE IN DOING THINGS: NOT AT ALL
SUM OF ALL RESPONSES TO PHQ9 QUESTIONS 1 AND 2: 0
SUM OF ALL RESPONSES TO PHQ9 QUESTIONS 1 AND 2: 1
2. FEELING DOWN, DEPRESSED OR HOPELESS: SEVERAL DAYS

## 2025-08-23 LAB
25(OH)D3+25(OH)D2 SERPL-MCNC: 89 NG/ML (ref 30–100)
ALBUMIN SERPL-MCNC: 4.3 G/DL (ref 3.6–5.1)
ALP SERPL-CCNC: 60 U/L (ref 35–144)
ALT SERPL-CCNC: 9 U/L (ref 9–46)
ANION GAP SERPL CALCULATED.4IONS-SCNC: 8 MMOL/L (CALC) (ref 7–17)
AST SERPL-CCNC: 12 U/L (ref 10–35)
BILIRUB SERPL-MCNC: 0.8 MG/DL (ref 0.2–1.2)
BUN SERPL-MCNC: 19 MG/DL (ref 7–25)
CALCIUM SERPL-MCNC: 9.8 MG/DL (ref 8.6–10.3)
CHLORIDE SERPL-SCNC: 102 MMOL/L (ref 98–110)
CHOLEST SERPL-MCNC: 152 MG/DL
CHOLEST/HDLC SERPL: 2.9 (CALC)
CO2 SERPL-SCNC: 28 MMOL/L (ref 20–32)
CREAT SERPL-MCNC: 0.99 MG/DL (ref 0.7–1.22)
EGFRCR SERPLBLD CKD-EPI 2021: 77 ML/MIN/1.73M2
ERYTHROCYTE [DISTWIDTH] IN BLOOD BY AUTOMATED COUNT: 13.1 % (ref 11–15)
GLUCOSE SERPL-MCNC: 93 MG/DL (ref 65–99)
HCT VFR BLD AUTO: 44.1 % (ref 38.5–50)
HDLC SERPL-MCNC: 53 MG/DL
HGB BLD-MCNC: 14.7 G/DL (ref 13.2–17.1)
LDLC SERPL CALC-MCNC: 80 MG/DL (CALC)
MCH RBC QN AUTO: 33.2 PG (ref 27–33)
MCHC RBC AUTO-ENTMCNC: 33.3 G/DL (ref 32–36)
MCV RBC AUTO: 99.5 FL (ref 80–100)
NONHDLC SERPL-MCNC: 99 MG/DL (CALC)
PLATELET # BLD AUTO: 201 THOUSAND/UL (ref 140–400)
PMV BLD REES-ECKER: 10.7 FL (ref 7.5–12.5)
POTASSIUM SERPL-SCNC: 4.4 MMOL/L (ref 3.5–5.3)
PROT SERPL-MCNC: 7.2 G/DL (ref 6.1–8.1)
PSA SERPL-MCNC: 1.89 NG/ML
RBC # BLD AUTO: 4.43 MILLION/UL (ref 4.2–5.8)
SODIUM SERPL-SCNC: 138 MMOL/L (ref 135–146)
TESTOST SERPL-MCNC: 316 NG/DL (ref 250–827)
TRIGL SERPL-MCNC: 109 MG/DL
TSH SERPL-ACNC: 1.08 MIU/L (ref 0.4–4.5)
WBC # BLD AUTO: 6 THOUSAND/UL (ref 3.8–10.8)

## 2025-08-25 DIAGNOSIS — G20.C PARKINSONISM, UNSPECIFIED PARKINSONISM TYPE (MULTI): ICD-10-CM

## 2025-08-25 DIAGNOSIS — B37.2 INTERTRIGO OF GENITOCRURAL REGION DUE TO CANDIDA SPECIES: ICD-10-CM

## 2025-08-25 DIAGNOSIS — K59.04 CHRONIC IDIOPATHIC CONSTIPATION: ICD-10-CM

## 2025-08-25 DIAGNOSIS — E55.9 VITAMIN D DEFICIENCY: ICD-10-CM

## 2025-08-25 DIAGNOSIS — E78.5 HYPERLIPIDEMIA, UNSPECIFIED HYPERLIPIDEMIA TYPE: ICD-10-CM

## 2025-08-25 DIAGNOSIS — R35.0 BENIGN PROSTATIC HYPERPLASIA WITH URINARY FREQUENCY: ICD-10-CM

## 2025-08-25 DIAGNOSIS — I95.1 ORTHOSTATIC HYPOTENSION DYSAUTONOMIC SYNDROME: ICD-10-CM

## 2025-08-25 DIAGNOSIS — N40.1 BENIGN PROSTATIC HYPERPLASIA WITH INCOMPLETE BLADDER EMPTYING: ICD-10-CM

## 2025-08-25 DIAGNOSIS — M15.9 OSTEOARTHRITIS, GENERALIZED: ICD-10-CM

## 2025-08-25 DIAGNOSIS — N32.81 OAB (OVERACTIVE BLADDER): ICD-10-CM

## 2025-08-25 DIAGNOSIS — N40.1 BENIGN PROSTATIC HYPERPLASIA WITH URINARY FREQUENCY: ICD-10-CM

## 2025-08-25 DIAGNOSIS — K21.9 GASTROESOPHAGEAL REFLUX DISEASE WITHOUT ESOPHAGITIS: ICD-10-CM

## 2025-08-25 DIAGNOSIS — R39.14 BENIGN PROSTATIC HYPERPLASIA WITH INCOMPLETE BLADDER EMPTYING: ICD-10-CM

## 2025-08-25 PROBLEM — N17.9 ACUTE KIDNEY INJURY: Status: ACTIVE | Noted: 2025-08-25

## 2025-08-25 PROBLEM — G21.19 PARKINSONISM DUE TO DRUG (MULTI): Status: ACTIVE | Noted: 2025-08-25

## 2025-08-25 PROBLEM — R40.4 EPISODE OF UNRESPONSIVENESS: Status: ACTIVE | Noted: 2025-03-21

## 2025-08-25 PROBLEM — R91.1 LUNG NODULE: Status: ACTIVE | Noted: 2025-08-25

## 2025-08-25 PROBLEM — K56.609 SMALL BOWEL OBSTRUCTION (MULTI): Status: ACTIVE | Noted: 2025-08-25

## 2025-08-25 PROBLEM — E66.811 CLASS 1 OBESITY WITH BODY MASS INDEX (BMI) OF 31.0 TO 31.9 IN ADULT: Status: ACTIVE | Noted: 2025-08-22

## 2025-08-25 RX ORDER — ASPIRIN 81 MG/1
81 TABLET ORAL DAILY
Qty: 90 TABLET | Refills: 3 | Status: SHIPPED | OUTPATIENT
Start: 2025-08-25 | End: 2025-08-25

## 2025-08-25 RX ORDER — LANOLIN ALCOHOL/MO/W.PET/CERES
1000 CREAM (GRAM) TOPICAL DAILY
Qty: 90 TABLET | Refills: 3 | Status: SHIPPED | OUTPATIENT
Start: 2025-08-25

## 2025-08-25 RX ORDER — MELOXICAM 7.5 MG/1
7.5 TABLET ORAL DAILY
Qty: 90 TABLET | Refills: 3 | Status: SHIPPED | OUTPATIENT
Start: 2025-08-25

## 2025-08-25 RX ORDER — MELOXICAM 7.5 MG/1
7.5 TABLET ORAL DAILY
Qty: 90 TABLET | Refills: 3 | Status: SHIPPED | OUTPATIENT
Start: 2025-08-25 | End: 2025-08-25 | Stop reason: SDUPTHER

## 2025-08-25 RX ORDER — MULTIVIT-MIN/FA/LYCOPEN/LUTEIN .4-300-25
1 TABLET ORAL DAILY
Qty: 90 TABLET | Refills: 3 | Status: SHIPPED | OUTPATIENT
Start: 2025-08-25 | End: 2025-08-25

## 2025-08-25 RX ORDER — POLYETHYLENE GLYCOL 3350 17 G/17G
17 POWDER, FOR SOLUTION ORAL DAILY
Qty: 1700 G | Refills: 3 | Status: SHIPPED | OUTPATIENT
Start: 2025-08-25 | End: 2025-08-25

## 2025-08-25 RX ORDER — MIRABEGRON 25 MG/1
25 TABLET, FILM COATED, EXTENDED RELEASE ORAL DAILY
Qty: 90 TABLET | Refills: 2 | Status: SHIPPED | OUTPATIENT
Start: 2025-08-25 | End: 2025-08-25

## 2025-08-25 RX ORDER — POLYETHYLENE GLYCOL 3350 17 G/17G
17 POWDER, FOR SOLUTION ORAL DAILY
Qty: 1700 G | Refills: 3 | Status: SHIPPED | OUTPATIENT
Start: 2025-08-25 | End: 2025-08-25 | Stop reason: SDUPTHER

## 2025-08-25 RX ORDER — LANOLIN ALCOHOL/MO/W.PET/CERES
1000 CREAM (GRAM) TOPICAL DAILY
Qty: 90 TABLET | Refills: 3 | Status: SHIPPED | OUTPATIENT
Start: 2025-08-25 | End: 2025-08-25

## 2025-08-25 RX ORDER — ERGOCALCIFEROL 1.25 MG/1
1.25 CAPSULE ORAL
Qty: 12 CAPSULE | Refills: 3 | Status: SHIPPED | OUTPATIENT
Start: 2025-08-31 | End: 2025-08-25

## 2025-08-25 RX ORDER — PANTOPRAZOLE SODIUM 40 MG/1
40 TABLET, DELAYED RELEASE ORAL DAILY
Qty: 90 TABLET | Refills: 3 | Status: SHIPPED | OUTPATIENT
Start: 2025-08-25 | End: 2025-08-25 | Stop reason: SDUPTHER

## 2025-08-25 RX ORDER — POLYETHYLENE GLYCOL 3350 17 G/17G
17 POWDER, FOR SOLUTION ORAL DAILY
Qty: 1700 G | Refills: 3 | Status: SHIPPED | OUTPATIENT
Start: 2025-08-25

## 2025-08-25 RX ORDER — PANTOPRAZOLE SODIUM 40 MG/1
40 TABLET, DELAYED RELEASE ORAL DAILY
Qty: 90 TABLET | Refills: 3 | Status: SHIPPED | OUTPATIENT
Start: 2025-08-25 | End: 2025-08-25

## 2025-08-25 RX ORDER — TAMSULOSIN HYDROCHLORIDE 0.4 MG/1
0.8 CAPSULE ORAL DAILY
Qty: 180 CAPSULE | Refills: 2 | Status: SHIPPED | OUTPATIENT
Start: 2025-08-25 | End: 2025-08-25 | Stop reason: SDUPTHER

## 2025-08-25 RX ORDER — PRAVASTATIN SODIUM 40 MG/1
40 TABLET ORAL DAILY
Qty: 90 TABLET | Refills: 3 | Status: SHIPPED | OUTPATIENT
Start: 2025-08-25 | End: 2025-08-25 | Stop reason: SDUPTHER

## 2025-08-25 RX ORDER — TAMSULOSIN HYDROCHLORIDE 0.4 MG/1
0.8 CAPSULE ORAL DAILY
Qty: 180 CAPSULE | Refills: 3 | Status: SHIPPED | OUTPATIENT
Start: 2025-08-25 | End: 2026-08-20

## 2025-08-25 RX ORDER — MIRABEGRON 25 MG/1
25 TABLET, FILM COATED, EXTENDED RELEASE ORAL DAILY
Qty: 90 TABLET | Refills: 3 | Status: SHIPPED | OUTPATIENT
Start: 2025-08-25

## 2025-08-25 RX ORDER — FINASTERIDE 5 MG/1
5 TABLET, FILM COATED ORAL DAILY
Qty: 90 TABLET | Refills: 3 | Status: SHIPPED | OUTPATIENT
Start: 2025-08-25 | End: 2025-08-25

## 2025-08-25 RX ORDER — FINASTERIDE 5 MG/1
5 TABLET, FILM COATED ORAL DAILY
Qty: 90 TABLET | Refills: 3 | Status: SHIPPED | OUTPATIENT
Start: 2025-08-25

## 2025-08-25 RX ORDER — FINASTERIDE 5 MG/1
5 TABLET, FILM COATED ORAL DAILY
Qty: 90 TABLET | Refills: 3 | Status: SHIPPED | OUTPATIENT
Start: 2025-08-25 | End: 2025-08-25 | Stop reason: SDUPTHER

## 2025-08-25 RX ORDER — ASPIRIN 81 MG/1
81 TABLET ORAL DAILY
Qty: 90 TABLET | Refills: 3 | Status: SHIPPED | OUTPATIENT
Start: 2025-08-25

## 2025-08-25 RX ORDER — ERGOCALCIFEROL 1.25 MG/1
1.25 CAPSULE ORAL
Qty: 12 CAPSULE | Refills: 3 | Status: SHIPPED | OUTPATIENT
Start: 2025-08-31 | End: 2025-08-25 | Stop reason: SDUPTHER

## 2025-08-25 RX ORDER — PRAVASTATIN SODIUM 40 MG/1
40 TABLET ORAL DAILY
Qty: 90 TABLET | Refills: 3 | Status: SHIPPED | OUTPATIENT
Start: 2025-08-25 | End: 2025-08-25

## 2025-08-25 RX ORDER — NYSTATIN 100000 [USP'U]/G
POWDER TOPICAL
Qty: 60 G | Refills: 11 | Status: SHIPPED | OUTPATIENT
Start: 2025-08-25 | End: 2025-08-25

## 2025-08-25 RX ORDER — ASCORBIC ACID 500 MG
500 TABLET,CHEWABLE ORAL 2 TIMES DAILY
Qty: 180 TABLET | Refills: 3 | Status: SHIPPED | OUTPATIENT
Start: 2025-08-25 | End: 2025-08-25

## 2025-08-25 RX ORDER — ASCORBIC ACID 500 MG
500 TABLET,CHEWABLE ORAL 2 TIMES DAILY
Qty: 180 TABLET | Refills: 3 | Status: SHIPPED | OUTPATIENT
Start: 2025-08-25

## 2025-08-25 RX ORDER — MIRABEGRON 25 MG/1
25 TABLET, FILM COATED, EXTENDED RELEASE ORAL DAILY
Qty: 90 TABLET | Refills: 2 | Status: SHIPPED | OUTPATIENT
Start: 2025-08-25 | End: 2025-08-25 | Stop reason: SDUPTHER

## 2025-08-25 RX ORDER — PRAVASTATIN SODIUM 40 MG/1
40 TABLET ORAL DAILY
Qty: 90 TABLET | Refills: 3 | Status: SHIPPED | OUTPATIENT
Start: 2025-08-25

## 2025-08-25 RX ORDER — TAMSULOSIN HYDROCHLORIDE 0.4 MG/1
0.8 CAPSULE ORAL DAILY
Qty: 180 CAPSULE | Refills: 2 | Status: SHIPPED | OUTPATIENT
Start: 2025-08-25 | End: 2025-08-25

## 2025-08-25 RX ORDER — NYSTATIN 100000 [USP'U]/G
POWDER TOPICAL
Qty: 60 G | Refills: 11 | Status: SHIPPED | OUTPATIENT
Start: 2025-08-25 | End: 2025-08-25 | Stop reason: SDUPTHER

## 2025-08-25 RX ORDER — NYSTATIN 100000 [USP'U]/G
POWDER TOPICAL
Qty: 60 G | Refills: 11 | Status: SHIPPED | OUTPATIENT
Start: 2025-08-25 | End: 2025-08-26

## 2025-08-25 RX ORDER — MELATONIN 3 MG
3 CAPSULE ORAL DAILY
Qty: 90 CAPSULE | Refills: 3 | Status: SHIPPED | OUTPATIENT
Start: 2025-08-25 | End: 2025-08-25

## 2025-08-25 RX ORDER — MELATONIN 3 MG
3 CAPSULE ORAL DAILY
Qty: 90 CAPSULE | Refills: 3 | Status: SHIPPED | OUTPATIENT
Start: 2025-08-25

## 2025-08-25 RX ORDER — MELOXICAM 7.5 MG/1
7.5 TABLET ORAL DAILY
Qty: 90 TABLET | Refills: 3 | Status: SHIPPED | OUTPATIENT
Start: 2025-08-25 | End: 2025-08-25

## 2025-08-25 RX ORDER — ERGOCALCIFEROL 1.25 MG/1
1.25 CAPSULE ORAL
Qty: 12 CAPSULE | Refills: 3 | Status: SHIPPED | OUTPATIENT
Start: 2025-08-25

## 2025-08-25 RX ORDER — MULTIVIT-MIN/FA/LYCOPEN/LUTEIN .4-300-25
1 TABLET ORAL DAILY
Qty: 90 TABLET | Refills: 3 | Status: SHIPPED | OUTPATIENT
Start: 2025-08-25

## 2025-08-25 RX ORDER — PANTOPRAZOLE SODIUM 40 MG/1
40 TABLET, DELAYED RELEASE ORAL DAILY
Qty: 90 TABLET | Refills: 3 | Status: SHIPPED | OUTPATIENT
Start: 2025-08-25

## 2025-08-26 DIAGNOSIS — L30.4 INTERTRIGO: Primary | ICD-10-CM

## 2025-08-26 RX ORDER — MICONAZOLE NITRATE 2 G/100G
POWDER TOPICAL AS NEEDED
Qty: 85 G | Refills: 5 | Status: SHIPPED | OUTPATIENT
Start: 2025-08-26 | End: 2025-08-27 | Stop reason: SDUPTHER

## 2025-08-27 ENCOUNTER — APPOINTMENT (OUTPATIENT)
Dept: NEUROLOGY | Facility: CLINIC | Age: 80
End: 2025-08-27
Payer: MEDICARE

## 2025-08-27 ENCOUNTER — TELEPHONE (OUTPATIENT)
Dept: PRIMARY CARE | Facility: CLINIC | Age: 80
End: 2025-08-27

## 2025-08-27 DIAGNOSIS — L30.4 INTERTRIGO: ICD-10-CM

## 2025-08-27 DIAGNOSIS — G20.C PARKINSONISM, UNSPECIFIED PARKINSONISM TYPE (MULTI): ICD-10-CM

## 2025-08-27 DIAGNOSIS — I95.1 ORTHOSTATIC HYPOTENSION DYSAUTONOMIC SYNDROME: ICD-10-CM

## 2025-08-27 RX ORDER — CARBIDOPA AND LEVODOPA 25; 100 MG/1; MG/1
TABLET ORAL
Qty: 270 TABLET | Refills: 3 | Status: SHIPPED | OUTPATIENT
Start: 2025-08-27

## 2025-08-27 RX ORDER — PYRIDOSTIGMINE BROMIDE 60 MG/1
60 TABLET ORAL 3 TIMES DAILY
Qty: 270 TABLET | Refills: 3 | OUTPATIENT
Start: 2025-08-27 | End: 2026-08-27

## 2025-08-27 RX ORDER — PYRIDOSTIGMINE BROMIDE 60 MG/1
60 TABLET ORAL 3 TIMES DAILY
Qty: 270 TABLET | Refills: 3 | Status: SHIPPED | OUTPATIENT
Start: 2025-08-27 | End: 2026-08-27

## 2025-08-27 RX ORDER — CARBIDOPA AND LEVODOPA 25; 100 MG/1; MG/1
TABLET ORAL
Qty: 270 TABLET | Refills: 3 | OUTPATIENT
Start: 2025-08-27

## 2025-08-27 RX ORDER — MICONAZOLE NITRATE 2 G/100G
POWDER TOPICAL 2 TIMES DAILY PRN
Qty: 85 G | Refills: 5 | Status: SHIPPED | OUTPATIENT
Start: 2025-08-27

## 2025-08-27 RX ORDER — MICONAZOLE NITRATE 2 G/100G
POWDER TOPICAL AS NEEDED
Qty: 85 G | Refills: 5 | OUTPATIENT
Start: 2025-08-27

## 2025-08-28 DIAGNOSIS — F25.0 SCHIZOAFFECTIVE DISORDER, MIXED TYPE (MULTI): ICD-10-CM

## 2025-08-28 DIAGNOSIS — R46.81 OBSESSIVE-COMPULSIVE BEHAVIOR: ICD-10-CM

## 2025-08-28 DIAGNOSIS — F41.8 MIXED ANXIETY AND DEPRESSIVE DISORDER: ICD-10-CM

## 2025-08-28 DIAGNOSIS — F03.918 PSYCHOSIS IN ELDERLY WITH BEHAVIORAL DISTURBANCE (MULTI): ICD-10-CM

## 2025-08-28 RX ORDER — DIVALPROEX SODIUM 500 MG/1
500 TABLET, DELAYED RELEASE ORAL NIGHTLY
Qty: 90 TABLET | Refills: 1 | Status: SHIPPED | OUTPATIENT
Start: 2025-08-28 | End: 2026-02-24

## 2025-08-28 RX ORDER — QUETIAPINE FUMARATE 25 MG/1
25 TABLET, FILM COATED ORAL NIGHTLY
Qty: 90 TABLET | Refills: 1 | Status: SHIPPED | OUTPATIENT
Start: 2025-08-28 | End: 2026-02-24

## 2025-08-28 RX ORDER — SERTRALINE HYDROCHLORIDE 25 MG/1
TABLET, FILM COATED ORAL
Qty: 90 TABLET | Refills: 1 | Status: SHIPPED | OUTPATIENT
Start: 2025-08-28

## 2025-09-16 ENCOUNTER — APPOINTMENT (OUTPATIENT)
Dept: CARDIOLOGY | Facility: CLINIC | Age: 80
End: 2025-09-16
Payer: MEDICARE

## 2025-09-29 ENCOUNTER — APPOINTMENT (OUTPATIENT)
Dept: BEHAVIORAL HEALTH | Facility: CLINIC | Age: 80
End: 2025-09-29
Payer: MEDICARE

## 2025-12-19 ENCOUNTER — APPOINTMENT (OUTPATIENT)
Dept: NEUROLOGY | Facility: CLINIC | Age: 80
End: 2025-12-19
Payer: MEDICARE

## 2026-04-01 ENCOUNTER — APPOINTMENT (OUTPATIENT)
Dept: PRIMARY CARE | Facility: CLINIC | Age: 81
End: 2026-04-01
Payer: MEDICARE